# Patient Record
Sex: MALE | Race: WHITE | NOT HISPANIC OR LATINO | Employment: OTHER | URBAN - METROPOLITAN AREA
[De-identification: names, ages, dates, MRNs, and addresses within clinical notes are randomized per-mention and may not be internally consistent; named-entity substitution may affect disease eponyms.]

---

## 2018-06-20 LAB
BACTERIA UR QL AUTO: ABNORMAL
BILIRUB UR QL STRIP: NEGATIVE
CLARITY UR: CLEAR
COLOR UR: YELLOW
GLUCOSE UR STRIP-MCNC: NEGATIVE MG/DL
HGB UR QL STRIP.AUTO: NEGATIVE
KETONES UR STRIP-MCNC: NEGATIVE MG/DL
LEUKOCYTE ESTERASE UR QL STRIP: ABNORMAL
MUCUS THREADS (HISTORICAL): ABNORMAL /HPF
NITRITE UR QL STRIP: NEGATIVE
NON-SQ EPI CELLS URNS QL MICRO: ABNORMAL /HPF
PH UR STRIP.AUTO: 6 [PH] (ref 4.5–8)
PROT UR STRIP-MCNC: ABNORMAL MG/DL
RBC #/AREA URNS AUTO: ABNORMAL /HPF
SP GR UR STRIP.AUTO: 1.02 (ref 1–1.03)
UROBILINOGEN UR QL STRIP.AUTO: 0.2 EU/DL (ref 0.2–8)
WBC #/AREA URNS AUTO: ABNORMAL /HPF

## 2018-07-11 ENCOUNTER — TRANSCRIBE ORDERS (OUTPATIENT)
Dept: ADMINISTRATIVE | Facility: HOSPITAL | Age: 83
End: 2018-07-11

## 2018-07-11 ENCOUNTER — APPOINTMENT (OUTPATIENT)
Dept: LAB | Facility: HOSPITAL | Age: 83
End: 2018-07-11
Attending: UROLOGY
Payer: COMMERCIAL

## 2018-07-11 DIAGNOSIS — C61 PROSTATE CA (HCC): ICD-10-CM

## 2018-07-11 DIAGNOSIS — J44.9 CHRONIC OBSTRUCTIVE PULMONARY DISEASE, UNSPECIFIED COPD TYPE (HCC): Primary | ICD-10-CM

## 2018-07-11 DIAGNOSIS — I10 HYPERTENSION, UNSPECIFIED TYPE: ICD-10-CM

## 2018-07-11 DIAGNOSIS — K81.9 CHOLECYSTITIS: ICD-10-CM

## 2018-07-11 DIAGNOSIS — I48.91 ATRIAL FIBRILLATION, UNSPECIFIED TYPE (HCC): ICD-10-CM

## 2018-07-11 DIAGNOSIS — J44.9 CHRONIC OBSTRUCTIVE PULMONARY DISEASE, UNSPECIFIED COPD TYPE (HCC): ICD-10-CM

## 2018-07-11 LAB
ALBUMIN SERPL BCP-MCNC: 3.4 G/DL (ref 3.5–5.7)
ALP SERPL-CCNC: 92 U/L (ref 55–165)
ALT SERPL W P-5'-P-CCNC: 15 U/L (ref 7–52)
ANION GAP SERPL CALCULATED.3IONS-SCNC: 6 MMOL/L (ref 4–13)
AST SERPL W P-5'-P-CCNC: 19 U/L (ref 13–39)
BASOPHILS # BLD AUTO: 0 THOUSANDS/ΜL (ref 0–0.1)
BASOPHILS NFR BLD AUTO: 1 % (ref 0–2)
BILIRUB DIRECT SERPL-MCNC: 0.2 MG/DL (ref 0–0.2)
BILIRUB SERPL-MCNC: 0.5 MG/DL (ref 0.2–1)
BUN SERPL-MCNC: 26 MG/DL (ref 7–25)
CALCIUM SERPL-MCNC: 9.2 MG/DL (ref 8.6–10.5)
CHLORIDE SERPL-SCNC: 105 MMOL/L (ref 98–107)
CO2 SERPL-SCNC: 32 MMOL/L (ref 21–31)
CREAT SERPL-MCNC: 1.43 MG/DL (ref 0.7–1.3)
EOSINOPHIL # BLD AUTO: 0.2 THOUSAND/ΜL (ref 0–0.61)
EOSINOPHIL # BLD AUTO: 0.46 THOUSAND/UL (ref 0–0.61)
EOSINOPHIL NFR BLD AUTO: 3 % (ref 0–5)
EOSINOPHIL NFR BLD MANUAL: 6 % (ref 0–6)
ERYTHROCYTE [DISTWIDTH] IN BLOOD BY AUTOMATED COUNT: 13.8 % (ref 11.5–14.5)
GFR SERPL CREATININE-BSD FRML MDRD: 43 ML/MIN/1.73SQ M
GLUCOSE P FAST SERPL-MCNC: 96 MG/DL (ref 65–99)
HCT VFR BLD AUTO: 42.8 % (ref 36.5–49.3)
HGB BLD-MCNC: 14.6 G/DL (ref 14–18)
LYMPHOCYTES # BLD AUTO: 0.7 THOUSANDS/ΜL (ref 0.6–4.47)
LYMPHOCYTES # BLD AUTO: 1.16 THOUSAND/UL (ref 0.6–4.47)
LYMPHOCYTES # BLD AUTO: 15 % (ref 20–51)
LYMPHOCYTES NFR BLD AUTO: 9 % (ref 21–51)
MCH RBC QN AUTO: 30.9 PG (ref 26–34)
MCHC RBC AUTO-ENTMCNC: 34.1 G/DL (ref 31–37)
MCV RBC AUTO: 91 FL (ref 81–99)
MONOCYTES # BLD AUTO: 1 THOUSAND/UL (ref 0–1.22)
MONOCYTES # BLD AUTO: 1 THOUSAND/ΜL (ref 0.17–1.22)
MONOCYTES NFR BLD AUTO: 13 % (ref 4–12)
MONOCYTES NFR BLD AUTO: 14 % (ref 2–12)
NEUTROPHILS # BLD AUTO: 5.6 THOUSANDS/ΜL (ref 1.4–6.5)
NEUTS SEG # BLD: 5.08 THOUSAND/UL (ref 1.81–6.82)
NEUTS SEG NFR BLD AUTO: 66 % (ref 42–75)
NEUTS SEG NFR BLD AUTO: 74 % (ref 42–75)
NRBC BLD AUTO-RTO: 0 /100 WBCS
PLATELET # BLD AUTO: 201 THOUSANDS/UL (ref 149–390)
PLATELET BLD QL SMEAR: ADEQUATE
PMV BLD AUTO: 8.4 FL (ref 8.6–11.7)
POTASSIUM SERPL-SCNC: 4.1 MMOL/L (ref 3.5–5.5)
PROT SERPL-MCNC: 7.1 G/DL (ref 6.4–8.9)
PSA SERPL-MCNC: 0.1 NG/ML (ref 0–4)
RBC # BLD AUTO: 4.73 MILLION/UL (ref 4.3–5.9)
RBC MORPH BLD: NORMAL
SODIUM SERPL-SCNC: 143 MMOL/L (ref 134–143)
TOTAL CELLS COUNTED SPEC: 100
WBC # BLD AUTO: 7.7 THOUSAND/UL (ref 4.8–10.8)

## 2018-07-11 PROCEDURE — 80048 BASIC METABOLIC PNL TOTAL CA: CPT

## 2018-07-11 PROCEDURE — 85025 COMPLETE CBC W/AUTO DIFF WBC: CPT

## 2018-07-11 PROCEDURE — 36415 COLL VENOUS BLD VENIPUNCTURE: CPT

## 2018-07-11 PROCEDURE — 85027 COMPLETE CBC AUTOMATED: CPT

## 2018-07-11 PROCEDURE — 85007 BL SMEAR W/DIFF WBC COUNT: CPT

## 2018-07-11 PROCEDURE — 80076 HEPATIC FUNCTION PANEL: CPT

## 2018-07-11 PROCEDURE — G0103 PSA SCREENING: HCPCS

## 2019-01-11 ENCOUNTER — TRANSCRIBE ORDERS (OUTPATIENT)
Dept: ADMINISTRATIVE | Facility: HOSPITAL | Age: 84
End: 2019-01-11

## 2019-01-11 ENCOUNTER — APPOINTMENT (OUTPATIENT)
Dept: LAB | Facility: HOSPITAL | Age: 84
End: 2019-01-11
Attending: INTERNAL MEDICINE
Payer: COMMERCIAL

## 2019-01-11 DIAGNOSIS — I25.10 CORONARY ARTERY DISEASE WITHOUT ANGINA PECTORIS, UNSPECIFIED VESSEL OR LESION TYPE, UNSPECIFIED WHETHER NATIVE OR TRANSPLANTED HEART: ICD-10-CM

## 2019-01-11 DIAGNOSIS — I10 HYPERTENSION, UNSPECIFIED TYPE: ICD-10-CM

## 2019-01-11 DIAGNOSIS — J44.9 CHRONIC OBSTRUCTIVE PULMONARY DISEASE, UNSPECIFIED COPD TYPE (HCC): ICD-10-CM

## 2019-01-11 DIAGNOSIS — J44.9 CHRONIC OBSTRUCTIVE PULMONARY DISEASE, UNSPECIFIED COPD TYPE (HCC): Primary | ICD-10-CM

## 2019-01-11 LAB
ANION GAP SERPL CALCULATED.3IONS-SCNC: 5 MMOL/L (ref 4–13)
BUN SERPL-MCNC: 25 MG/DL (ref 7–25)
CALCIUM SERPL-MCNC: 9.2 MG/DL (ref 8.6–10.5)
CHLORIDE SERPL-SCNC: 106 MMOL/L (ref 98–107)
CO2 SERPL-SCNC: 33 MMOL/L (ref 21–31)
CREAT SERPL-MCNC: 1.5 MG/DL (ref 0.7–1.3)
ERYTHROCYTE [DISTWIDTH] IN BLOOD BY AUTOMATED COUNT: 14.3 % (ref 11.5–14.5)
GFR SERPL CREATININE-BSD FRML MDRD: 41 ML/MIN/1.73SQ M
GLUCOSE P FAST SERPL-MCNC: 98 MG/DL (ref 65–99)
HCT VFR BLD AUTO: 44.6 % (ref 36.5–49.3)
HGB BLD-MCNC: 15.5 G/DL (ref 14–18)
MCH RBC QN AUTO: 31.7 PG (ref 26–34)
MCHC RBC AUTO-ENTMCNC: 34.7 G/DL (ref 31–37)
MCV RBC AUTO: 91 FL (ref 81–99)
PLATELET # BLD AUTO: 157 THOUSANDS/UL (ref 149–390)
PMV BLD AUTO: 8.4 FL (ref 8.6–11.7)
POTASSIUM SERPL-SCNC: 4 MMOL/L (ref 3.5–5.5)
RBC # BLD AUTO: 4.9 MILLION/UL (ref 4.3–5.9)
SODIUM SERPL-SCNC: 144 MMOL/L (ref 134–143)
WBC # BLD AUTO: 7.1 THOUSAND/UL (ref 4.8–10.8)

## 2019-01-11 PROCEDURE — 80048 BASIC METABOLIC PNL TOTAL CA: CPT

## 2019-01-11 PROCEDURE — 36415 COLL VENOUS BLD VENIPUNCTURE: CPT

## 2019-01-11 PROCEDURE — 85027 COMPLETE CBC AUTOMATED: CPT

## 2019-07-17 ENCOUNTER — APPOINTMENT (OUTPATIENT)
Dept: LAB | Facility: HOSPITAL | Age: 84
End: 2019-07-17
Attending: INTERNAL MEDICINE
Payer: COMMERCIAL

## 2019-07-17 ENCOUNTER — TRANSCRIBE ORDERS (OUTPATIENT)
Dept: LAB | Facility: HOSPITAL | Age: 84
End: 2019-07-17

## 2019-07-17 DIAGNOSIS — J44.9 CHRONIC OBSTRUCTIVE PULMONARY DISEASE, UNSPECIFIED COPD TYPE (HCC): ICD-10-CM

## 2019-07-17 DIAGNOSIS — E53.8 VITAMIN B12 DEFICIENCY: ICD-10-CM

## 2019-07-17 DIAGNOSIS — E53.8 VITAMIN B12 DEFICIENCY: Primary | ICD-10-CM

## 2019-07-17 LAB
ANION GAP SERPL CALCULATED.3IONS-SCNC: 8 MMOL/L (ref 4–13)
BUN SERPL-MCNC: 31 MG/DL (ref 7–25)
CALCIUM SERPL-MCNC: 9.2 MG/DL (ref 8.6–10.5)
CHLORIDE SERPL-SCNC: 101 MMOL/L (ref 98–107)
CO2 SERPL-SCNC: 31 MMOL/L (ref 21–31)
CREAT SERPL-MCNC: 1.74 MG/DL (ref 0.7–1.3)
ERYTHROCYTE [DISTWIDTH] IN BLOOD BY AUTOMATED COUNT: 13.9 % (ref 11.5–14.5)
GFR SERPL CREATININE-BSD FRML MDRD: 34 ML/MIN/1.73SQ M
GLUCOSE P FAST SERPL-MCNC: 90 MG/DL (ref 65–99)
HCT VFR BLD AUTO: 43.3 % (ref 42–47)
HGB BLD-MCNC: 14.7 G/DL (ref 14–18)
MCH RBC QN AUTO: 31.2 PG (ref 26–34)
MCHC RBC AUTO-ENTMCNC: 33.9 G/DL (ref 31–37)
MCV RBC AUTO: 92 FL (ref 81–99)
PLATELET # BLD AUTO: 173 THOUSANDS/UL (ref 149–390)
PMV BLD AUTO: 8.5 FL (ref 8.6–11.7)
POTASSIUM SERPL-SCNC: 4.1 MMOL/L (ref 3.5–5.5)
RBC # BLD AUTO: 4.71 MILLION/UL (ref 4.3–5.9)
SODIUM SERPL-SCNC: 140 MMOL/L (ref 134–143)
VIT B12 SERPL-MCNC: 375 PG/ML (ref 100–900)
WBC # BLD AUTO: 7.4 THOUSAND/UL (ref 4.8–10.8)

## 2019-07-17 PROCEDURE — 85027 COMPLETE CBC AUTOMATED: CPT

## 2019-07-17 PROCEDURE — 36415 COLL VENOUS BLD VENIPUNCTURE: CPT

## 2019-07-17 PROCEDURE — 82607 VITAMIN B-12: CPT

## 2019-07-17 PROCEDURE — 80048 BASIC METABOLIC PNL TOTAL CA: CPT

## 2020-02-20 ENCOUNTER — APPOINTMENT (OUTPATIENT)
Dept: LAB | Facility: HOSPITAL | Age: 85
End: 2020-02-20
Attending: INTERNAL MEDICINE
Payer: COMMERCIAL

## 2020-02-20 ENCOUNTER — TRANSCRIBE ORDERS (OUTPATIENT)
Dept: LAB | Facility: HOSPITAL | Age: 85
End: 2020-02-20

## 2020-02-20 DIAGNOSIS — J44.9 CHRONIC OBSTRUCTIVE PULMONARY DISEASE, UNSPECIFIED COPD TYPE (HCC): ICD-10-CM

## 2020-02-20 DIAGNOSIS — J44.9 CHRONIC OBSTRUCTIVE PULMONARY DISEASE, UNSPECIFIED COPD TYPE (HCC): Primary | ICD-10-CM

## 2020-02-20 LAB
ANION GAP SERPL CALCULATED.3IONS-SCNC: 6 MMOL/L (ref 4–13)
BUN SERPL-MCNC: 21 MG/DL (ref 7–25)
CALCIUM SERPL-MCNC: 9.1 MG/DL (ref 8.6–10.5)
CHLORIDE SERPL-SCNC: 101 MMOL/L (ref 98–107)
CO2 SERPL-SCNC: 34 MMOL/L (ref 21–31)
CREAT SERPL-MCNC: 1.64 MG/DL (ref 0.7–1.3)
ERYTHROCYTE [DISTWIDTH] IN BLOOD BY AUTOMATED COUNT: 14.4 % (ref 11.5–14.5)
GFR SERPL CREATININE-BSD FRML MDRD: 37 ML/MIN/1.73SQ M
GLUCOSE SERPL-MCNC: 101 MG/DL (ref 65–99)
HCT VFR BLD AUTO: 42.5 % (ref 42–47)
HGB BLD-MCNC: 14.1 G/DL (ref 14–18)
MCH RBC QN AUTO: 31 PG (ref 26–34)
MCHC RBC AUTO-ENTMCNC: 33.3 G/DL (ref 31–37)
MCV RBC AUTO: 93 FL (ref 81–99)
PLATELET # BLD AUTO: 146 THOUSANDS/UL (ref 149–390)
PMV BLD AUTO: 8.2 FL (ref 8.6–11.7)
POTASSIUM SERPL-SCNC: 4.1 MMOL/L (ref 3.5–5.5)
RBC # BLD AUTO: 4.56 MILLION/UL (ref 4.3–5.9)
SODIUM SERPL-SCNC: 141 MMOL/L (ref 134–143)
WBC # BLD AUTO: 6.6 THOUSAND/UL (ref 4.8–10.8)

## 2020-02-20 PROCEDURE — 36415 COLL VENOUS BLD VENIPUNCTURE: CPT

## 2020-02-20 PROCEDURE — 80048 BASIC METABOLIC PNL TOTAL CA: CPT

## 2020-02-20 PROCEDURE — 85027 COMPLETE CBC AUTOMATED: CPT

## 2020-05-18 ENCOUNTER — HOSPITAL ENCOUNTER (OUTPATIENT)
Dept: RADIOLOGY | Facility: HOSPITAL | Age: 85
Discharge: HOME | End: 2020-05-18
Attending: INTERNAL MEDICINE
Payer: COMMERCIAL

## 2020-05-18 ENCOUNTER — TRANSCRIBE ORDERS (OUTPATIENT)
Dept: ADMINISTRATIVE | Facility: HOSPITAL | Age: 85
End: 2020-05-18

## 2020-05-18 DIAGNOSIS — M25.561 RIGHT KNEE PAIN, UNSPECIFIED CHRONICITY: Primary | ICD-10-CM

## 2020-05-18 DIAGNOSIS — M25.561 RIGHT KNEE PAIN, UNSPECIFIED CHRONICITY: ICD-10-CM

## 2020-05-18 PROCEDURE — 73560 X-RAY EXAM OF KNEE 1 OR 2: CPT

## 2020-09-16 ENCOUNTER — HOSPITAL ENCOUNTER (EMERGENCY)
Facility: HOSPITAL | Age: 85
Discharge: HOME | End: 2020-09-16
Attending: EMERGENCY MEDICINE | Admitting: EMERGENCY MEDICINE
Payer: COMMERCIAL

## 2020-09-16 ENCOUNTER — APPOINTMENT (EMERGENCY)
Dept: CT IMAGING | Facility: HOSPITAL | Age: 85
End: 2020-09-16
Payer: COMMERCIAL

## 2020-09-16 ENCOUNTER — APPOINTMENT (EMERGENCY)
Dept: RADIOLOGY | Facility: HOSPITAL | Age: 85
End: 2020-09-16
Payer: COMMERCIAL

## 2020-09-16 VITALS
HEIGHT: 72 IN | BODY MASS INDEX: 21.95 KG/M2 | RESPIRATION RATE: 20 BRPM | DIASTOLIC BLOOD PRESSURE: 68 MMHG | HEART RATE: 88 BPM | WEIGHT: 162.04 LBS | OXYGEN SATURATION: 99 % | SYSTOLIC BLOOD PRESSURE: 139 MMHG | TEMPERATURE: 97.5 F
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
## 2020-09-16 DIAGNOSIS — R79.1 ELEVATED INR: ICD-10-CM

## 2020-09-16 DIAGNOSIS — I71.4: ICD-10-CM

## 2020-09-16 LAB
ALBUMIN SERPL BCP-MCNC: 3.3 G/DL (ref 3.5–5.7)
ALP SERPL-CCNC: 93 U/L (ref 55–165)
ALT SERPL W P-5'-P-CCNC: 10 U/L (ref 7–52)
ANION GAP SERPL CALCULATED.3IONS-SCNC: 7 MMOL/L (ref 4–13)
APTT PPP: 54 SECONDS (ref 23–37)
AST SERPL W P-5'-P-CCNC: 18 U/L (ref 13–39)
BASOPHILS # BLD AUTO: 0 THOUSANDS/ΜL (ref 0–0.1)
BASOPHILS NFR BLD AUTO: 0 % (ref 0–2)
BILIRUB SERPL-MCNC: 0.4 MG/DL (ref 0.2–1)
BUN SERPL-MCNC: 57 MG/DL (ref 7–25)
CALCIUM ALBUM COR SERPL-MCNC: 9.9 MG/DL (ref 8.3–10.1)
CALCIUM SERPL-MCNC: 9.3 MG/DL (ref 8.6–10.5)
CHLORIDE SERPL-SCNC: 100 MMOL/L (ref 98–107)
CO2 SERPL-SCNC: 35 MMOL/L (ref 21–31)
CREAT SERPL-MCNC: 2 MG/DL (ref 0.7–1.3)
EOSINOPHIL # BLD AUTO: 0 THOUSAND/ΜL (ref 0–0.61)
EOSINOPHIL NFR BLD AUTO: 1 % (ref 0–5)
ERYTHROCYTE [DISTWIDTH] IN BLOOD BY AUTOMATED COUNT: 15.2 % (ref 11.5–14.5)
GFR SERPL CREATININE-BSD FRML MDRD: 29 ML/MIN/1.73SQ M
GLUCOSE SERPL-MCNC: 96 MG/DL (ref 65–99)
HCT VFR BLD AUTO: 30.9 % (ref 42–47)
HGB BLD-MCNC: 10.2 G/DL (ref 14–18)
INR PPP: 4.09 (ref 0.84–1.19)
LYMPHOCYTES # BLD AUTO: 0.5 THOUSANDS/ΜL (ref 0.6–4.47)
LYMPHOCYTES NFR BLD AUTO: 5 % (ref 21–51)
MAGNESIUM SERPL-MCNC: 1.9 MG/DL (ref 1.9–2.7)
MCH RBC QN AUTO: 27.8 PG (ref 26–34)
MCHC RBC AUTO-ENTMCNC: 32.9 G/DL (ref 31–37)
MCV RBC AUTO: 85 FL (ref 81–99)
MONOCYTES # BLD AUTO: 1 THOUSAND/ΜL (ref 0.17–1.22)
MONOCYTES NFR BLD AUTO: 11 % (ref 2–12)
NEUTROPHILS # BLD AUTO: 7.7 THOUSANDS/ΜL (ref 1.4–6.5)
NEUTS SEG NFR BLD AUTO: 83 % (ref 42–75)
PLATELET # BLD AUTO: 335 THOUSANDS/UL (ref 149–390)
PMV BLD AUTO: 7.6 FL (ref 8.6–11.7)
POTASSIUM SERPL-SCNC: 4 MMOL/L (ref 3.5–5.5)
PROT SERPL-MCNC: 7.6 G/DL (ref 6.4–8.9)
PROTHROMBIN TIME: 39 SECONDS (ref 11.6–14.5)
RBC # BLD AUTO: 3.66 MILLION/UL (ref 4.3–5.9)
SODIUM SERPL-SCNC: 142 MMOL/L (ref 134–143)
WBC # BLD AUTO: 9.3 THOUSAND/UL (ref 4.8–10.8)

## 2020-09-16 PROCEDURE — 85610 PROTHROMBIN TIME: CPT | Performed by: EMERGENCY MEDICINE

## 2020-09-16 PROCEDURE — 70450 CT HEAD/BRAIN W/O DYE: CPT

## 2020-09-16 PROCEDURE — G1004 CDSM NDSC: HCPCS

## 2020-09-16 PROCEDURE — 72125 CT NECK SPINE W/O DYE: CPT

## 2020-09-16 PROCEDURE — 71260 CT THORAX DX C+: CPT

## 2020-09-16 PROCEDURE — 83735 ASSAY OF MAGNESIUM: CPT | Performed by: EMERGENCY MEDICINE

## 2020-09-16 PROCEDURE — 99284 EMERGENCY DEPT VISIT MOD MDM: CPT

## 2020-09-16 PROCEDURE — 80053 COMPREHEN METABOLIC PANEL: CPT | Performed by: EMERGENCY MEDICINE

## 2020-09-16 PROCEDURE — 73502 X-RAY EXAM HIP UNI 2-3 VIEWS: CPT

## 2020-09-16 PROCEDURE — 85730 THROMBOPLASTIN TIME PARTIAL: CPT | Performed by: EMERGENCY MEDICINE

## 2020-09-16 PROCEDURE — 85025 COMPLETE CBC W/AUTO DIFF WBC: CPT | Performed by: EMERGENCY MEDICINE

## 2020-09-16 PROCEDURE — 99285 EMERGENCY DEPT VISIT HI MDM: CPT | Performed by: EMERGENCY MEDICINE

## 2020-09-16 PROCEDURE — 74177 CT ABD & PELVIS W/CONTRAST: CPT

## 2020-09-16 PROCEDURE — 36415 COLL VENOUS BLD VENIPUNCTURE: CPT | Performed by: EMERGENCY MEDICINE

## 2020-09-16 RX ADMIN — IOHEXOL 100 ML: 350 INJECTION, SOLUTION INTRAVENOUS at 10:07

## 2020-09-16 NOTE — DISCHARGE INSTRUCTIONS
Arnol Boucher was seen in the emergency department after his fall and landing on his right hip  He did not have an significant injuries on his CT scans  He had multiple incidental findings during his stay in the emergency department  This includes the enlarged aortic aneurysm which is larger than in prior studies  As you discussed with the prior vascular specialists, this may lead to sudden and unpreventable death  Follow up with your PCP for further guidance  Additionally, your INR was elevated from your Coumadin to 4 02  Hold your evening dose and call your PCP today to discuss follow up

## 2020-09-16 NOTE — ED PROVIDER NOTES
History  Chief Complaint   Patient presents with    Hip Pain     Pt fell this morning, landing on his knees  Pt was using a cane even though he was told to use a walker  Pt states he landed on his knees then R hip  Patient presents after fall from standing this morning  States he was going to the bathroom and felt his right knee "give" out  He normally walks with a rollator but was using a cane this morning, which he believes contributed to the fall  He landed on bilateral knees and then his right side  Denies hip pain  EMS reports he is on coumadin for a fib  Denies LOC  Hip Pain   Location:  Right hip  Quality:  Dull  Severity:  Mild  Onset quality:  Sudden  Duration:  1 hour  Timing:  Constant  Progression:  Improving  Chronicity:  New  Context:  Fall  Associated symptoms: no abdominal pain, no chest pain, no congestion, no cough, no diarrhea, no fever, no nausea, no rash, no rhinorrhea, no sore throat, no vomiting and no wheezing        Prior to Admission Medications   Prescriptions Last Dose Informant Patient Reported? Taking?    OXYBUTYNIN TD  Self Yes No   Sig: Place on the skin   albuterol (PROAIR HFA) 90 mcg/act inhaler  Self Yes No   Sig: ProAir HFA 90 mcg/actuation aerosol inhaler   furosemide (LASIX) 40 mg tablet  Self Yes No   Sig: furosemide 40 mg tablet   take 1 tablet by mouth once daily   potassium chloride (MICRO-K) 10 MEQ CR capsule  Self Yes No   Sig: potassium chloride ER 10 mEq capsule,extended release   ursodiol (ACTIGALL) 250 mg tablet   Yes No   Sig: Take 250 mg by mouth 3 (three) times a day   verapamil (CALAN-SR) 180 mg CR tablet  Self Yes No   Sig: verapamil ER (SR) 180 mg tablet,extended release      Facility-Administered Medications: None       Past Medical History:   Diagnosis Date    A-fib ([facility] )     BPH (benign prostatic hyperplasia)     Cholelithiasis     Hypertension     Prostate cancer [facility])        Past Surgical History:   Procedure Laterality Date    TONSILLECTOMY History reviewed  No pertinent family history  I have reviewed and agree with the history as documented  E-Cigarette/Vaping     E-Cigarette/Vaping Substances     Social History     Tobacco Use    Smoking status: Current Some Day Smoker     Packs/day: 0 50     Types: Cigarettes    Smokeless tobacco: Never Used   Substance Use Topics    Alcohol use: Not Currently    Drug use: Never       Review of Systems   Constitutional: Negative for chills and fever  HENT: Negative for congestion, rhinorrhea and sore throat  Eyes: Negative for photophobia and visual disturbance  Respiratory: Negative for cough and wheezing  Cardiovascular: Negative for chest pain and palpitations  Gastrointestinal: Negative for abdominal pain, diarrhea, nausea and vomiting  Genitourinary: Negative for difficulty urinating and testicular pain  Musculoskeletal: Negative for back pain and neck pain  Skin: Negative for rash and wound  Neurological: Negative for weakness and numbness  Psychiatric/Behavioral: Negative for dysphoric mood and suicidal ideas  Physical Exam  Physical Exam  Vitals signs and nursing note reviewed  Constitutional:       Appearance: Normal appearance  He is well-developed  HENT:      Head: Normocephalic and atraumatic  Eyes:      Conjunctiva/sclera: Conjunctivae normal       Pupils: Pupils are equal, round, and reactive to light  Neck:      Musculoskeletal: Normal range of motion and neck supple  No muscular tenderness  Trachea: No tracheal deviation  Cardiovascular:      Rate and Rhythm: Normal rate and regular rhythm  Pulses:           Radial pulses are 2+ on the right side and 2+ on the left side  Dorsalis pedis pulses are 2+ on the right side and 2+ on the left side  Posterior tibial pulses are 2+ on the right side and 2+ on the left side  Heart sounds: Normal heart sounds  No murmur     Pulmonary:      Effort: Pulmonary effort is normal  No respiratory distress  Breath sounds: Normal breath sounds  No wheezing or rales  Abdominal:      General: Bowel sounds are normal  There is no distension  Palpations: Abdomen is soft  Tenderness: There is no abdominal tenderness  Musculoskeletal: Normal range of motion  General: No tenderness or deformity  Comments: No hip tenderness   Skin:     General: Skin is warm and dry  Capillary Refill: Capillary refill takes less than 2 seconds  Neurological:      General: No focal deficit present  Mental Status: He is alert  He is disoriented  Sensory: No sensory deficit        Comments: AAOx2, not to time (baseline per daughter Jaci Arredondo)   Psychiatric:         Judgment: Judgment normal          Vital Signs  ED Triage Vitals [09/16/20 0939]   Temperature Pulse Respirations Blood Pressure SpO2   97 5 °F (36 4 °C) 88 20 139/68 99 %      Temp Source Heart Rate Source Patient Position - Orthostatic VS BP Location FiO2 (%)   Temporal Monitor Sitting Left arm --      Pain Score       --           Vitals:    09/16/20 0939   BP: 139/68   Pulse: 88   Patient Position - Orthostatic VS: Sitting         Visual Acuity  Visual Acuity      Most Recent Value   L Pupil Size (mm)  3   R Pupil Size (mm)  3          ED Medications  Medications   iohexol (OMNIPAQUE) 350 MG/ML injection (MULTI-DOSE) 100 mL (100 mL Intravenous Given 9/16/20 1007)       Diagnostic Studies  Results Reviewed     Procedure Component Value Units Date/Time    Protime-INR [[id number]]  (Abnormal) Collected:  09/16/20 0955    Lab Status:  Final result Specimen:  Blood from Arm, Left Updated:  09/16/20 1048     Protime 39 0 seconds      INR 4 09    APTT [[id number]]  (Abnormal) Collected:  09/16/20 0955    Lab Status:  Final result Specimen:  Blood from Arm, Left Updated:  09/16/20 1048     PTT 54 seconds     Comprehensive metabolic panel [[id number]]  (Abnormal) Collected:  09/16/20 0955    Lab Status:  Final result Specimen: Blood from Arm, Left Updated:  09/16/20 1048     Sodium 142 mmol/L      Potassium 4 0 mmol/L      Chloride 100 mmol/L      CO2 35 mmol/L      ANION GAP 7 mmol/L      BUN 57 mg/dL      Creatinine 2 00 mg/dL      Glucose 96 mg/dL      Calcium 9 3 mg/dL      Corrected Calcium 9 9 mg/dL      AST 18 U/L      ALT 10 U/L      Alkaline Phosphatase 93 U/L      Total Protein 7 6 g/dL      Albumin 3 3 g/dL      Total Bilirubin 0 40 mg/dL      eGFR 29 ml/min/1 73sq m     Narrative:       National Kid[employer] Disease Foundation guidelines for Chronic Kidney Disease (CKD):     Stage 1 with normal or high GFR (GFR > 90 mL/min/1 73 square meters)    Stage 2 Mild CKD (GFR = 60-89 mL/min/1 73 square meters)    Stage 3A Moderate CKD (GFR = 45-59 mL/min/1 73 square meters)    Stage 3B Moderate CKD (GFR = 30-44 mL/min/1 73 square meters)    Stage 4 Severe CKD (GFR = 15-29 mL/min/1 73 square meters)    Stage 5 End Stage CKD (GFR <15 mL/min/1 73 square meters)  Note: GFR calculation is accurate only with a steady state creatinine    Magnesium [[id number]]  (Normal) Collected:  09/16/20 0955    Lab Status:  Final result Specimen:  Blood from Arm, Left Updated:  09/16/20 1043     Magnesium 1 9 mg/dL     CBC and differential [[id number]]  (Abnormal) Collected:  09/16/20 0955    Lab Status:  Final result Specimen:  Blood from Arm, Left Updated:  09/16/20 1035     WBC 9 30 Thousand/uL      RBC 3 66 Million/uL      Hemoglobin 10 2 g/dL      Hematocrit 30 9 %      MCV 85 fL      MCH 27 8 pg      MCHC 32 9 g/dL      RDW 15 2 %      MPV 7 6 fL      Platelets 094 Thousands/uL      Neutrophils Relative 83 %      Lymphocytes Relative 5 %      Monocytes Relative 11 %      Eosinophils Relative 1 %      Basophils Relative 0 %      Neutrophils Absolute 7 70 Thousands/µL      Lymphocytes Absolute 0 50 Thousands/µL      Monocytes Absolute 1 00 Thousand/µL      Eosinophils Absolute 0 00 Thousand/µL      Basophils Absolute 0 00 Thousands/µL XR hip/pelv 2-3 vws right if performed   Final Result by Latesha Pabon MD (09/16 1224)   Mild degenerative arthritis both hips   Multilevel degenerative lumbar spondylosis   No acute osseous abnormality  Workstation performed: [id number]         TRAUMA - CT chest abdomen pelvis w contrast   Final Result by Kendell Beebe MD (09/16 1035)         1  Massive, 8 cm abdominal aortic aneurysm has significantly increased in size from the prior study  Further clinical assessment recommended  Consider vascular surgery assessment, as clinically indicated  Considerations related to the patient's age    and/or comorbidities may be used to alter these recommendations      2   Emphysema  3   Small area of strandy densities in the right upper lobe may represent scarring with traction bronchiectasis versus mild pneumonitis  Further clinical assessment recommended  4   Cholelithiasis  5   Nonobstructing right intrarenal calculus  No hydronephrosis  6   No acute traumatic injury detected in the chest, abdomen or pelvis  Workstation performed: [id number]         TRAUMA - CT spine cervical wo contrast   Final Result by Kendell Beebe MD (09/16 1023)         1  No acute fracture or subluxation  2   Moderate spondylosis  3   Emphysema  Workstation performed: [id number]         TRAUMA - CT head wo contrast   Final Result by Kendell Beebe MD (09/16 1015)      No acute intracranial hemorrhage, mass effect or edema  Microangiopathic changes  Workstation performed: [id number]                    Procedures  Procedures         ED Course  ED Course as of Sep 16 1448   Wed Sep 16, 2020   1107 Large AAA on CT, increased from size  Patient previously deemed unsuitable for intervention by vascular surgery in 2018  His pulses are intact  INR elevated to 4 but no active bleeding         1133 Discussed AAA with patient who remembers his prior evaluation by vascular surgery and is comfortable without doing anything  I also called his daughter who confirmed he is at his baseline, understands this aneurysm enlarged and may rupture causing immediate and unavoidable death  Additionally, he has an elevated INR and will follow up with Dr Rachelle Ren and call the office today for further recommendation  She will come to the ER and verify he is at his baseline mental status, and able to ambulate with the rollator  MDM  Number of Diagnoses or Management Options  Aortic aneurysm, abdominal ([facility] [address] ): established and worsening  Elevated INR: new and requires workup  Fall, initial encounter: new and requires workup  Diagnosis management comments: Patient with some confusion on exam, possibly baseline but in the setting of trauma on coumadin, trauma eval called on provider discretion  Will CT head/spine/cap as patient is poor historian  No pain or tenderness on exam      CT head, spine, cap reassuring  Some incidental findings; large 8 2cm aneurysm  Asymptomatic  Chart review indicates patient was evaluated by vascular surgery 2 years ago and had expected clinical course of enlarged aneurysm and rupture, leading to eventual and unavoidable death  Patient was informed of this and recalls this discussion with the vascular specialists and is comfortable with this scenario  I also discussed this with his daughter Claire Spring who is comfortable with this plan  Additionally, he had elevated INR   Will hold coumadin       Amount and/or Complexity of Data Reviewed  Obtain history from someone other than the patient: yes  Review and summarize past medical records: yes  Independent visualization of images, tracings, or specimens: yes    Risk of Complications, Morbidity, and/or Mortality  Presenting problems: high  Diagnostic procedures: minimal  Management options: minimal        Disposition  Final diagnoses:   Fall, initial encounter   Aortic aneurysm, abdominal ([facility] [address] ) Elevated INR     Time reflects when diagnosis was documented in both MDM as applicable and the Disposition within this note     Time User Action Codes Description Comment    9/16/2020 11:43 AM Deisi Costa [Q34  Keshawn Blotter Fall, initial encounter     9/16/2020 11:43 AM Deisi Costa [I71 4] Aortic aneurysm, abdominal ([address] )     9/16/2020 11:43 AM Deisi Costa [R79 1] Elevated INR       ED Disposition     ED Disposition Condition Date/Time Comment    Discharge Stable Wed Sep 16, 2020 11:55 AM Eric Rodriguez discharge to home/self care  Follow-up Information     Follow up With Specialties Details Why Contact Info    Shani Roth DO Internal Medicine Call today  [address] [facility]  [phone]            Discharge Medication List as of 9/16/2020 11:56 AM      CONTINUE these medications which have NOT CHANGED    Details   albuterol (PROAIR HFA) 90 mcg/act inhaler ProAir HFA 90 mcg/actuation aerosol inhaler, Historical Med      furosemide (LASIX) 40 mg tablet furosemide 40 mg tablet   take 1 tablet by mouth once daily, Historical Med      OXYBUTYNIN TD Place on the skin, Historical Med      potassium chloride (MICRO-K) 10 MEQ CR capsule potassium chloride ER 10 mEq capsule,extended release, Historical Med      ursodiol (ACTIGALL) 250 mg tablet Take 250 mg by mouth 3 (three) times a day, Historical Med      verapamil (CALAN-SR) 180 mg CR tablet verapamil ER (SR) 180 mg tablet,extended release, Historical Med           No discharge procedures on file      PDMP Review     None          ED Provider  Electronically Signed by           Darylene Hugger, DO  09/16/20 3877

## 2020-10-08 ENCOUNTER — HOSPITAL ENCOUNTER (EMERGENCY)
Facility: HOSPITAL | Age: 85
Discharge: HOME | End: 2020-10-08
Attending: FAMILY MEDICINE | Admitting: EMERGENCY MEDICINE
Payer: COMMERCIAL

## 2020-10-08 ENCOUNTER — APPOINTMENT (EMERGENCY)
Dept: RADIOLOGY | Facility: HOSPITAL | Age: 85
End: 2020-10-08
Payer: COMMERCIAL

## 2020-10-08 VITALS
HEART RATE: 84 BPM | DIASTOLIC BLOOD PRESSURE: 64 MMHG | OXYGEN SATURATION: 98 % | SYSTOLIC BLOOD PRESSURE: 117 MMHG | RESPIRATION RATE: 28 BRPM | BODY MASS INDEX: 21.54 KG/M2 | WEIGHT: 159 LBS | HEIGHT: 72 IN

## 2020-10-08 DIAGNOSIS — R53.1 WEAKNESS: ICD-10-CM

## 2020-10-08 DIAGNOSIS — R79.81 LOW OXYGEN SATURATION: Primary | ICD-10-CM

## 2020-10-08 DIAGNOSIS — F03.90 DEMENTIA (HCC): ICD-10-CM

## 2020-10-08 LAB
ALBUMIN SERPL BCP-MCNC: 3.1 G/DL (ref 3.5–5.7)
ALP SERPL-CCNC: 94 U/L (ref 55–165)
ALT SERPL W P-5'-P-CCNC: 11 U/L (ref 7–52)
ANION GAP SERPL CALCULATED.3IONS-SCNC: 8 MMOL/L (ref 4–13)
APTT PPP: 45 SECONDS (ref 23–37)
AST SERPL W P-5'-P-CCNC: 15 U/L (ref 13–39)
BASE EX.OXY STD BLDV CALC-SCNC: 46.5 %
BASE EXCESS BLDV CALC-SCNC: 5.5 MMOL/L
BILIRUB SERPL-MCNC: 0.3 MG/DL (ref 0.2–1)
BUN SERPL-MCNC: 52 MG/DL (ref 7–25)
CALCIUM ALBUM COR SERPL-MCNC: 9.4 MG/DL (ref 8.3–10.1)
CALCIUM SERPL-MCNC: 8.7 MG/DL (ref 8.6–10.5)
CHLORIDE SERPL-SCNC: 98 MMOL/L (ref 98–107)
CO2 SERPL-SCNC: 31 MMOL/L (ref 21–31)
CREAT SERPL-MCNC: 2.01 MG/DL (ref 0.7–1.3)
EOSINOPHIL # BLD AUTO: 0.17 THOUSAND/UL (ref 0–0.61)
EOSINOPHIL NFR BLD MANUAL: 2 % (ref 0–6)
ERYTHROCYTE [DISTWIDTH] IN BLOOD BY AUTOMATED COUNT: 16.5 % (ref 11.5–14.5)
FLUAV RNA NPH QL NAA+PROBE: NORMAL
FLUBV RNA NPH QL NAA+PROBE: NORMAL
GFR SERPL CREATININE-BSD FRML MDRD: 28 ML/MIN/1.73SQ M
GLUCOSE SERPL-MCNC: 94 MG/DL (ref 65–99)
HCO3 BLDV-SCNC: 32.2 MMOL/L (ref 24–30)
HCT VFR BLD AUTO: 27.3 % (ref 42–47)
HGB BLD-MCNC: 9 G/DL (ref 14–18)
INR PPP: 1.91 (ref 0.84–1.19)
LACTATE SERPL-SCNC: 1.5 MMOL/L (ref 0.5–2)
LIPASE SERPL-CCNC: 19 U/L (ref 11–82)
LYMPHOCYTES # BLD AUTO: 1.11 THOUSAND/UL (ref 0.6–4.47)
LYMPHOCYTES # BLD AUTO: 13 % (ref 20–51)
MAGNESIUM SERPL-MCNC: 2.2 MG/DL (ref 1.9–2.7)
MCH RBC QN AUTO: 27.9 PG (ref 26–34)
MCHC RBC AUTO-ENTMCNC: 33 G/DL (ref 31–37)
MCV RBC AUTO: 85 FL (ref 81–99)
MONOCYTES # BLD AUTO: 1.02 THOUSAND/UL (ref 0–1.22)
MONOCYTES NFR BLD AUTO: 12 % (ref 4–12)
NEUTS BAND NFR BLD MANUAL: 2 % (ref 0–8)
NEUTS SEG # BLD: 6.21 THOUSAND/UL (ref 1.81–6.82)
NEUTS SEG NFR BLD AUTO: 71 % (ref 42–75)
O2 CT BLDV-SCNC: 5.6 ML/DL
PCO2 BLDV: 58 MM HG
PH BLDV: 7.36 [PH] (ref 7.3–7.4)
PLATELET # BLD AUTO: 240 THOUSANDS/UL (ref 149–390)
PLATELET BLD QL SMEAR: ADEQUATE
PMV BLD AUTO: 7.4 FL (ref 8.6–11.7)
PO2 BLDV: 34 MM HG (ref 35–45)
POTASSIUM SERPL-SCNC: 4.3 MMOL/L (ref 3.5–5.5)
PROT SERPL-MCNC: 7.1 G/DL (ref 6.4–8.9)
PROTHROMBIN TIME: 21.6 SECONDS (ref 11.6–14.5)
RBC # BLD AUTO: 3.23 MILLION/UL (ref 4.3–5.9)
RBC MORPH BLD: NORMAL
RSV RNA NPH QL NAA+PROBE: NORMAL
SARS-COV-2 RNA RESP QL NAA+PROBE: NEGATIVE
SODIUM SERPL-SCNC: 137 MMOL/L (ref 134–143)
TOTAL CELLS COUNTED SPEC: 100
TROPONIN I SERPL-MCNC: <0.03 NG/ML
TSH SERPL DL<=0.05 MIU/L-ACNC: 3.72 UIU/ML (ref 0.45–5.33)
WBC # BLD AUTO: 8.5 THOUSAND/UL (ref 4.8–10.8)

## 2020-10-08 PROCEDURE — 80053 COMPREHEN METABOLIC PANEL: CPT | Performed by: EMERGENCY MEDICINE

## 2020-10-08 PROCEDURE — 71045 X-RAY EXAM CHEST 1 VIEW: CPT

## 2020-10-08 PROCEDURE — 82805 BLOOD GASES W/O2 SATURATION: CPT | Performed by: EMERGENCY MEDICINE

## 2020-10-08 PROCEDURE — 83690 ASSAY OF LIPASE: CPT | Performed by: EMERGENCY MEDICINE

## 2020-10-08 PROCEDURE — 84443 ASSAY THYROID STIM HORMONE: CPT | Performed by: EMERGENCY MEDICINE

## 2020-10-08 PROCEDURE — 87631 RESP VIRUS 3-5 TARGETS: CPT | Performed by: EMERGENCY MEDICINE

## 2020-10-08 PROCEDURE — 87635 SARS-COV-2 COVID-19 AMP PRB: CPT | Performed by: EMERGENCY MEDICINE

## 2020-10-08 PROCEDURE — 36415 COLL VENOUS BLD VENIPUNCTURE: CPT | Performed by: EMERGENCY MEDICINE

## 2020-10-08 PROCEDURE — 85730 THROMBOPLASTIN TIME PARTIAL: CPT | Performed by: EMERGENCY MEDICINE

## 2020-10-08 PROCEDURE — 83605 ASSAY OF LACTIC ACID: CPT | Performed by: EMERGENCY MEDICINE

## 2020-10-08 PROCEDURE — 85027 COMPLETE CBC AUTOMATED: CPT | Performed by: EMERGENCY MEDICINE

## 2020-10-08 PROCEDURE — 85007 BL SMEAR W/DIFF WBC COUNT: CPT | Performed by: EMERGENCY MEDICINE

## 2020-10-08 PROCEDURE — 99284 EMERGENCY DEPT VISIT MOD MDM: CPT

## 2020-10-08 PROCEDURE — 85610 PROTHROMBIN TIME: CPT | Performed by: EMERGENCY MEDICINE

## 2020-10-08 PROCEDURE — 87040 BLOOD CULTURE FOR BACTERIA: CPT | Performed by: EMERGENCY MEDICINE

## 2020-10-08 PROCEDURE — 93005 ELECTROCARDIOGRAM TRACING: CPT

## 2020-10-08 PROCEDURE — 83735 ASSAY OF MAGNESIUM: CPT | Performed by: EMERGENCY MEDICINE

## 2020-10-08 PROCEDURE — 99283 EMERGENCY DEPT VISIT LOW MDM: CPT | Performed by: EMERGENCY MEDICINE

## 2020-10-08 PROCEDURE — 84484 ASSAY OF TROPONIN QUANT: CPT | Performed by: EMERGENCY MEDICINE

## 2020-10-09 LAB
ATRIAL RATE: 86 BPM
QRS AXIS: 78 DEGREES
QRSD INTERVAL: 82 MS
QT INTERVAL: 384 MS
QTC INTERVAL: 428 MS
T WAVE AXIS: 70 DEGREES
VENTRICULAR RATE: 75 BPM

## 2020-10-09 PROCEDURE — 93010 ELECTROCARDIOGRAM REPORT: CPT | Performed by: INTERNAL MEDICINE

## 2020-10-13 LAB
BACTERIA BLD CULT: NORMAL
BACTERIA BLD CULT: NORMAL

## 2020-12-01 ENCOUNTER — APPOINTMENT (EMERGENCY)
Dept: CT IMAGING | Facility: HOSPITAL | Age: 85
End: 2020-12-01
Payer: COMMERCIAL

## 2020-12-01 ENCOUNTER — HOSPITAL ENCOUNTER (EMERGENCY)
Facility: HOSPITAL | Age: 85
Discharge: HOME | End: 2020-12-01
Attending: EMERGENCY MEDICINE
Payer: COMMERCIAL

## 2020-12-01 VITALS
OXYGEN SATURATION: 97 % | RESPIRATION RATE: 18 BRPM | HEART RATE: 66 BPM | DIASTOLIC BLOOD PRESSURE: 63 MMHG | SYSTOLIC BLOOD PRESSURE: 118 MMHG | BODY MASS INDEX: 20.19 KG/M2 | WEIGHT: 149.03 LBS | TEMPERATURE: 97 F | HEIGHT: 72 IN
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
## 2020-12-01 DIAGNOSIS — S09.90XA CLOSED HEAD INJURY, INITIAL ENCOUNTER: ICD-10-CM

## 2020-12-01 PROCEDURE — 99284 EMERGENCY DEPT VISIT MOD MDM: CPT | Performed by: EMERGENCY MEDICINE

## 2020-12-01 PROCEDURE — 70450 CT HEAD/BRAIN W/O DYE: CPT

## 2020-12-01 PROCEDURE — 99284 EMERGENCY DEPT VISIT MOD MDM: CPT

## 2020-12-01 PROCEDURE — G1004 CDSM NDSC: HCPCS

## 2020-12-01 RX ORDER — POTASSIUM CHLORIDE 750 MG/1
TABLET, EXTENDED RELEASE ORAL DAILY
COMMUNITY
Start: 2020-09-14 | End: 2020-12-10 | Stop reason: HOSPADM

## 2020-12-01 RX ORDER — TAMSULOSIN HYDROCHLORIDE 0.4 MG/1
CAPSULE ORAL
COMMUNITY
End: 2020-12-10 | Stop reason: HOSPADM

## 2020-12-01 RX ORDER — OXYBUTYNIN CHLORIDE 15 MG/1
TABLET, EXTENDED RELEASE ORAL
COMMUNITY
End: 2020-12-10 | Stop reason: HOSPADM

## 2020-12-01 RX ORDER — WARFARIN SODIUM 5 MG/1
TABLET ORAL
COMMUNITY
Start: 2020-07-27 | End: 2020-12-10 | Stop reason: HOSPADM

## 2020-12-08 ENCOUNTER — HOSPITAL ENCOUNTER (INPATIENT)
Facility: HOSPITAL | Age: 85
LOS: 2 days | Discharge: HOSPICE | DRG: 871 | End: 2020-12-10
Attending: EMERGENCY MEDICINE | Admitting: HOSPITALIST
Payer: COMMERCIAL

## 2020-12-08 ENCOUNTER — APPOINTMENT (EMERGENCY)
Dept: RADIOLOGY | Facility: HOSPITAL | Age: 85
DRG: 871 | End: 2020-12-08
Payer: COMMERCIAL

## 2020-12-08 DIAGNOSIS — N17.0 ACUTE RENAL FAILURE WITH ACUTE TUBULAR NECROSIS SUPERIMPOSED ON STAGE 3B CHRONIC KIDNEY DISEASE (HCC): ICD-10-CM

## 2020-12-08 DIAGNOSIS — R53.1 GENERALIZED WEAKNESS: ICD-10-CM

## 2020-12-08 DIAGNOSIS — N39.0 UTI (URINARY TRACT INFECTION): Primary | ICD-10-CM

## 2020-12-08 DIAGNOSIS — D64.9 ANEMIA: ICD-10-CM

## 2020-12-08 DIAGNOSIS — A41.9 SEPSIS (HCC): ICD-10-CM

## 2020-12-08 DIAGNOSIS — R19.5 GUAIAC POSITIVE STOOLS: ICD-10-CM

## 2020-12-08 DIAGNOSIS — N18.32 ACUTE RENAL FAILURE WITH ACUTE TUBULAR NECROSIS SUPERIMPOSED ON STAGE 3B CHRONIC KIDNEY DISEASE (HCC): ICD-10-CM

## 2020-12-08 DIAGNOSIS — Z51.5 END OF LIFE CARE: ICD-10-CM

## 2020-12-08 PROBLEM — R33.8 BENIGN PROSTATIC HYPERPLASIA WITH URINARY RETENTION: Status: ACTIVE | Noted: 2020-12-08

## 2020-12-08 PROBLEM — R65.20 SEPSIS WITH ACUTE HYPOXIC RESPIRATORY FAILURE WITHOUT SEPTIC SHOCK (HCC): Status: ACTIVE | Noted: 2020-12-08

## 2020-12-08 PROBLEM — Z72.0 TOBACCO USE: Status: ACTIVE | Noted: 2020-12-08

## 2020-12-08 PROBLEM — J96.01 ACUTE RESPIRATORY FAILURE WITH HYPOXIA (HCC): Status: ACTIVE | Noted: 2020-12-08

## 2020-12-08 PROBLEM — N30.01 ACUTE CYSTITIS WITH HEMATURIA: Status: ACTIVE | Noted: 2020-12-08

## 2020-12-08 PROBLEM — I10 ESSENTIAL HYPERTENSION: Status: ACTIVE | Noted: 2020-12-08

## 2020-12-08 PROBLEM — I48.0 PAROXYSMAL ATRIAL FIBRILLATION (HCC): Status: ACTIVE | Noted: 2020-12-08

## 2020-12-08 PROBLEM — J96.01 SEPSIS WITH ACUTE HYPOXIC RESPIRATORY FAILURE WITHOUT SEPTIC SHOCK (HCC): Status: ACTIVE | Noted: 2020-12-08

## 2020-12-08 PROBLEM — R79.1 SUPRATHERAPEUTIC INR: Status: ACTIVE | Noted: 2020-12-08

## 2020-12-08 PROBLEM — L81.9: Status: ACTIVE | Noted: 2020-12-08

## 2020-12-08 PROBLEM — N40.1 BENIGN PROSTATIC HYPERPLASIA WITH URINARY RETENTION: Status: ACTIVE | Noted: 2020-12-08

## 2020-12-08 PROBLEM — D63.1 ANEMIA DUE TO STAGE 3B CHRONIC KIDNEY DISEASE (HCC): Status: ACTIVE | Noted: 2020-12-08

## 2020-12-08 PROBLEM — T14.8XXA MULTIPLE WOUNDS OF SKIN: Status: ACTIVE | Noted: 2020-12-08

## 2020-12-08 LAB
ABO GROUP BLD: NORMAL
ABO GROUP BLD: NORMAL
ALBUMIN SERPL BCP-MCNC: 2.6 G/DL (ref 3.5–5.7)
ALP SERPL-CCNC: 68 U/L (ref 55–165)
ALT SERPL W P-5'-P-CCNC: 4 U/L (ref 7–52)
ANION GAP SERPL CALCULATED.3IONS-SCNC: 10 MMOL/L (ref 4–13)
ANISOCYTOSIS BLD QL SMEAR: PRESENT
APTT PPP: 161 SECONDS (ref 23–37)
AST SERPL W P-5'-P-CCNC: 14 U/L (ref 13–39)
ATRIAL RATE: 63 BPM
BACTERIA UR QL AUTO: ABNORMAL /HPF
BASE EX.OXY STD BLDV CALC-SCNC: 73.1 %
BASE EXCESS BLDV CALC-SCNC: -0.8 MMOL/L
BILIRUB SERPL-MCNC: 0.4 MG/DL (ref 0.2–1)
BILIRUB UR QL STRIP: NEGATIVE
BLD GP AB SCN SERPL QL: NEGATIVE
BUN SERPL-MCNC: 88 MG/DL (ref 7–25)
CALCIUM ALBUM COR SERPL-MCNC: 9.1 MG/DL (ref 8.3–10.1)
CALCIUM SERPL-MCNC: 8 MG/DL (ref 8.6–10.5)
CHLORIDE SERPL-SCNC: 108 MMOL/L (ref 98–107)
CLARITY UR: ABNORMAL
CO2 SERPL-SCNC: 25 MMOL/L (ref 21–31)
COLOR UR: YELLOW
CREAT SERPL-MCNC: 2.1 MG/DL (ref 0.7–1.3)
ERYTHROCYTE [DISTWIDTH] IN BLOOD BY AUTOMATED COUNT: 18.5 % (ref 11.5–14.5)
FLUAV RNA RESP QL NAA+PROBE: NEGATIVE
FLUBV RNA RESP QL NAA+PROBE: NEGATIVE
GFR SERPL CREATININE-BSD FRML MDRD: 27 ML/MIN/1.73SQ M
GLUCOSE SERPL-MCNC: 115 MG/DL (ref 65–99)
GLUCOSE UR STRIP-MCNC: NEGATIVE MG/DL
HCO3 BLDV-SCNC: 24.5 MMOL/L (ref 24–30)
HCT VFR BLD AUTO: 18.5 % (ref 42–47)
HGB BLD-MCNC: 5.8 G/DL (ref 14–18)
HGB UR QL STRIP.AUTO: ABNORMAL
HYPERCHROMIA BLD QL SMEAR: PRESENT
INR PPP: 13.24 (ref 0.84–1.19)
KETONES UR STRIP-MCNC: NEGATIVE MG/DL
LACTATE SERPL-SCNC: 1.1 MMOL/L (ref 0.5–2)
LACTATE SERPL-SCNC: 2.5 MMOL/L (ref 0.5–2)
LEUKOCYTE ESTERASE UR QL STRIP: ABNORMAL
LYMPHOCYTES # BLD AUTO: 0.75 THOUSAND/UL (ref 0.6–4.47)
LYMPHOCYTES # BLD AUTO: 4 % (ref 20–51)
MCH RBC QN AUTO: 25.6 PG (ref 26–34)
MCHC RBC AUTO-ENTMCNC: 31.5 G/DL (ref 31–37)
MCV RBC AUTO: 81 FL (ref 81–99)
MICROCYTES BLD QL AUTO: PRESENT
MONOCYTES # BLD AUTO: 0.38 THOUSAND/UL (ref 0–1.22)
MONOCYTES NFR BLD AUTO: 2 % (ref 4–12)
NEUTS BAND NFR BLD MANUAL: 3 % (ref 0–8)
NEUTS SEG # BLD: 17.67 THOUSAND/UL (ref 1.81–6.82)
NEUTS SEG NFR BLD AUTO: 91 % (ref 42–75)
NITRITE UR QL STRIP: POSITIVE
NON-SQ EPI CELLS URNS QL MICRO: ABNORMAL /HPF
O2 CT BLDV-SCNC: 5.8 ML/DL
PCO2 BLDV: 46.4 MM HG
PH BLDV: 7.34 [PH] (ref 7.3–7.4)
PH UR STRIP.AUTO: 6 [PH]
PLATELET # BLD AUTO: 336 THOUSANDS/UL (ref 149–390)
PLATELET BLD QL SMEAR: ADEQUATE
PMV BLD AUTO: 7.1 FL (ref 8.6–11.7)
PO2 BLDV: 50 MM HG (ref 35–45)
POTASSIUM SERPL-SCNC: 4.1 MMOL/L (ref 3.5–5.5)
PROT SERPL-MCNC: 6.3 G/DL (ref 6.4–8.9)
PROT UR STRIP-MCNC: NEGATIVE MG/DL
PROTHROMBIN TIME: 97 SECONDS (ref 11.6–14.5)
QRS AXIS: 77 DEGREES
QRSD INTERVAL: 84 MS
QT INTERVAL: 406 MS
QTC INTERVAL: 462 MS
RBC # BLD AUTO: 2.27 MILLION/UL (ref 4.3–5.9)
RBC #/AREA URNS AUTO: ABNORMAL /HPF
RBC MORPH BLD: PRESENT
RH BLD: POSITIVE
RH BLD: POSITIVE
RSV RNA RESP QL NAA+PROBE: NEGATIVE
SARS-COV-2 RNA RESP QL NAA+PROBE: NEGATIVE
SODIUM SERPL-SCNC: 143 MMOL/L (ref 134–143)
SP GR UR STRIP.AUTO: 1.01 (ref 1–1.03)
SPECIMEN EXPIRATION DATE: NORMAL
T WAVE AXIS: 59 DEGREES
TARGETS BLD QL SMEAR: PRESENT
TOTAL CELLS COUNTED SPEC: 100
TROPONIN I SERPL-MCNC: <0.03 NG/ML
UROBILINOGEN UR QL STRIP.AUTO: 0.2 E.U./DL
VENTRICULAR RATE: 78 BPM
WBC # BLD AUTO: 18.8 THOUSAND/UL (ref 4.8–10.8)
WBC #/AREA URNS AUTO: ABNORMAL /HPF

## 2020-12-08 PROCEDURE — 86920 COMPATIBILITY TEST SPIN: CPT

## 2020-12-08 PROCEDURE — C9113 INJ PANTOPRAZOLE SODIUM, VIA: HCPCS | Performed by: NURSE PRACTITIONER

## 2020-12-08 PROCEDURE — 85730 THROMBOPLASTIN TIME PARTIAL: CPT | Performed by: EMERGENCY MEDICINE

## 2020-12-08 PROCEDURE — 80053 COMPREHEN METABOLIC PANEL: CPT | Performed by: EMERGENCY MEDICINE

## 2020-12-08 PROCEDURE — 85007 BL SMEAR W/DIFF WBC COUNT: CPT | Performed by: EMERGENCY MEDICINE

## 2020-12-08 PROCEDURE — 94664 DEMO&/EVAL PT USE INHALER: CPT

## 2020-12-08 PROCEDURE — 85610 PROTHROMBIN TIME: CPT | Performed by: EMERGENCY MEDICINE

## 2020-12-08 PROCEDURE — 96365 THER/PROPH/DIAG IV INF INIT: CPT

## 2020-12-08 PROCEDURE — 83605 ASSAY OF LACTIC ACID: CPT | Performed by: EMERGENCY MEDICINE

## 2020-12-08 PROCEDURE — 87186 SC STD MICRODIL/AGAR DIL: CPT | Performed by: EMERGENCY MEDICINE

## 2020-12-08 PROCEDURE — 86850 RBC ANTIBODY SCREEN: CPT | Performed by: EMERGENCY MEDICINE

## 2020-12-08 PROCEDURE — 84145 PROCALCITONIN (PCT): CPT | Performed by: EMERGENCY MEDICINE

## 2020-12-08 PROCEDURE — 99291 CRITICAL CARE FIRST HOUR: CPT | Performed by: EMERGENCY MEDICINE

## 2020-12-08 PROCEDURE — 36430 TRANSFUSION BLD/BLD COMPNT: CPT

## 2020-12-08 PROCEDURE — 86901 BLOOD TYPING SEROLOGIC RH(D): CPT | Performed by: EMERGENCY MEDICINE

## 2020-12-08 PROCEDURE — 86900 BLOOD TYPING SEROLOGIC ABO: CPT | Performed by: EMERGENCY MEDICINE

## 2020-12-08 PROCEDURE — 30233N1 TRANSFUSION OF NONAUTOLOGOUS RED BLOOD CELLS INTO PERIPHERAL VEIN, PERCUTANEOUS APPROACH: ICD-10-PCS | Performed by: HOSPITALIST

## 2020-12-08 PROCEDURE — 84484 ASSAY OF TROPONIN QUANT: CPT | Performed by: EMERGENCY MEDICINE

## 2020-12-08 PROCEDURE — 85027 COMPLETE CBC AUTOMATED: CPT | Performed by: EMERGENCY MEDICINE

## 2020-12-08 PROCEDURE — 93010 ELECTROCARDIOGRAM REPORT: CPT | Performed by: INTERNAL MEDICINE

## 2020-12-08 PROCEDURE — 84145 PROCALCITONIN (PCT): CPT | Performed by: NURSE PRACTITIONER

## 2020-12-08 PROCEDURE — 71045 X-RAY EXAM CHEST 1 VIEW: CPT

## 2020-12-08 PROCEDURE — 82728 ASSAY OF FERRITIN: CPT | Performed by: NURSE PRACTITIONER

## 2020-12-08 PROCEDURE — 93005 ELECTROCARDIOGRAM TRACING: CPT

## 2020-12-08 PROCEDURE — 82805 BLOOD GASES W/O2 SATURATION: CPT | Performed by: EMERGENCY MEDICINE

## 2020-12-08 PROCEDURE — 36415 COLL VENOUS BLD VENIPUNCTURE: CPT | Performed by: EMERGENCY MEDICINE

## 2020-12-08 PROCEDURE — 99223 1ST HOSP IP/OBS HIGH 75: CPT | Performed by: NURSE PRACTITIONER

## 2020-12-08 PROCEDURE — 94760 N-INVAS EAR/PLS OXIMETRY 1: CPT

## 2020-12-08 PROCEDURE — 96361 HYDRATE IV INFUSION ADD-ON: CPT

## 2020-12-08 PROCEDURE — 87077 CULTURE AEROBIC IDENTIFY: CPT | Performed by: EMERGENCY MEDICINE

## 2020-12-08 PROCEDURE — 0241U HB NFCT DS VIR RESP RNA 4 TRGT: CPT | Performed by: EMERGENCY MEDICINE

## 2020-12-08 PROCEDURE — 81001 URINALYSIS AUTO W/SCOPE: CPT | Performed by: EMERGENCY MEDICINE

## 2020-12-08 PROCEDURE — 83540 ASSAY OF IRON: CPT | Performed by: NURSE PRACTITIONER

## 2020-12-08 PROCEDURE — 87086 URINE CULTURE/COLONY COUNT: CPT | Performed by: EMERGENCY MEDICINE

## 2020-12-08 PROCEDURE — 99285 EMERGENCY DEPT VISIT HI MDM: CPT

## 2020-12-08 PROCEDURE — P9016 RBC LEUKOCYTES REDUCED: HCPCS

## 2020-12-08 PROCEDURE — 83550 IRON BINDING TEST: CPT | Performed by: NURSE PRACTITIONER

## 2020-12-08 PROCEDURE — 87040 BLOOD CULTURE FOR BACTERIA: CPT | Performed by: EMERGENCY MEDICINE

## 2020-12-08 RX ORDER — ACETAMINOPHEN 325 MG/1
650 TABLET ORAL EVERY 6 HOURS PRN
Status: DISCONTINUED | OUTPATIENT
Start: 2020-12-08 | End: 2020-12-09

## 2020-12-08 RX ORDER — CEFEPIME HYDROCHLORIDE 1 G/1
1000 INJECTION, POWDER, FOR SOLUTION INTRAMUSCULAR; INTRAVENOUS EVERY 12 HOURS
Status: DISCONTINUED | OUTPATIENT
Start: 2020-12-08 | End: 2020-12-08

## 2020-12-08 RX ORDER — CEFEPIME HYDROCHLORIDE 1 G/50ML
1000 INJECTION, SOLUTION INTRAVENOUS EVERY 12 HOURS
Status: DISCONTINUED | OUTPATIENT
Start: 2020-12-09 | End: 2020-12-09

## 2020-12-08 RX ORDER — OXYBUTYNIN CHLORIDE 5 MG/1
15 TABLET, EXTENDED RELEASE ORAL
Status: DISCONTINUED | OUTPATIENT
Start: 2020-12-08 | End: 2020-12-09

## 2020-12-08 RX ORDER — ALBUTEROL SULFATE 90 UG/1
1 AEROSOL, METERED RESPIRATORY (INHALATION) 4 TIMES DAILY
Status: DISCONTINUED | OUTPATIENT
Start: 2020-12-08 | End: 2020-12-08

## 2020-12-08 RX ORDER — SODIUM CHLORIDE 9 MG/ML
75 INJECTION, SOLUTION INTRAVENOUS CONTINUOUS
Status: DISCONTINUED | OUTPATIENT
Start: 2020-12-08 | End: 2020-12-09

## 2020-12-08 RX ORDER — ONDANSETRON 2 MG/ML
4 INJECTION INTRAMUSCULAR; INTRAVENOUS EVERY 6 HOURS PRN
Status: DISCONTINUED | OUTPATIENT
Start: 2020-12-08 | End: 2020-12-09

## 2020-12-08 RX ORDER — CEFEPIME HYDROCHLORIDE 2 G/50ML
2000 INJECTION, SOLUTION INTRAVENOUS ONCE
Status: COMPLETED | OUTPATIENT
Start: 2020-12-08 | End: 2020-12-08

## 2020-12-08 RX ORDER — NICOTINE 21 MG/24HR
1 PATCH, TRANSDERMAL 24 HOURS TRANSDERMAL DAILY
Status: DISCONTINUED | OUTPATIENT
Start: 2020-12-08 | End: 2020-12-09

## 2020-12-08 RX ORDER — URSODIOL 300 MG/1
300 CAPSULE ORAL 3 TIMES DAILY
Status: DISCONTINUED | OUTPATIENT
Start: 2020-12-08 | End: 2020-12-09

## 2020-12-08 RX ORDER — TAMSULOSIN HYDROCHLORIDE 0.4 MG/1
0.4 CAPSULE ORAL
Status: DISCONTINUED | OUTPATIENT
Start: 2020-12-08 | End: 2020-12-09

## 2020-12-08 RX ORDER — PANTOPRAZOLE SODIUM 40 MG/1
40 INJECTION, POWDER, FOR SOLUTION INTRAVENOUS EVERY 12 HOURS SCHEDULED
Status: DISCONTINUED | OUTPATIENT
Start: 2020-12-08 | End: 2020-12-09

## 2020-12-08 RX ORDER — CHLORHEXIDINE GLUCONATE 0.12 MG/ML
15 RINSE ORAL EVERY 12 HOURS SCHEDULED
Status: DISCONTINUED | OUTPATIENT
Start: 2020-12-08 | End: 2020-12-09

## 2020-12-08 RX ADMIN — TAMSULOSIN HYDROCHLORIDE 0.4 MG: 0.4 CAPSULE ORAL at 23:33

## 2020-12-08 RX ADMIN — NICOTINE 1 PATCH: 14 PATCH, EXTENDED RELEASE TRANSDERMAL at 23:30

## 2020-12-08 RX ADMIN — CHLORHEXIDINE GLUCONATE 0.12% ORAL RINSE 15 ML: 1.2 LIQUID ORAL at 23:37

## 2020-12-08 RX ADMIN — CARBIDOPA AND LEVODOPA 1 TABLET: 25; 100 TABLET ORAL at 23:33

## 2020-12-08 RX ADMIN — URSODIOL 300 MG: 300 CAPSULE ORAL at 23:34

## 2020-12-08 RX ADMIN — OXYBUTYNIN CHLORIDE 15 MG: 5 TABLET, EXTENDED RELEASE ORAL at 23:32

## 2020-12-08 RX ADMIN — PHYTONADIONE 10 MG: 10 INJECTION, EMULSION INTRAMUSCULAR; INTRAVENOUS; SUBCUTANEOUS at 17:50

## 2020-12-08 RX ADMIN — SODIUM CHLORIDE 1000 ML: 0.9 INJECTION, SOLUTION INTRAVENOUS at 16:26

## 2020-12-08 RX ADMIN — CEFEPIME HYDROCHLORIDE 2000 MG: 2 INJECTION, SOLUTION INTRAVENOUS at 15:47

## 2020-12-08 RX ADMIN — SODIUM CHLORIDE 1000 ML: 0.9 INJECTION, SOLUTION INTRAVENOUS at 15:47

## 2020-12-08 RX ADMIN — PANTOPRAZOLE SODIUM 40 MG: 40 INJECTION, POWDER, FOR SOLUTION INTRAVENOUS at 23:41

## 2020-12-08 NOTE — PLAN OF CARE
Problem: HEMATOLOGIC - ADULT  Goal: Maintains hematologic stability  Description: INTERVENTIONS  - Assess for signs and symptoms of bleeding or hemorrhage  - Monitor labs  - Administer supportive blood products/factors as ordered and appropriate  Outcome: Progressing     Problem: SKIN/TISSUE INTEGRITY - ADULT  Goal: Skin integrity remains intact  Description: INTERVENTIONS  - Identify patients at risk for skin breakdown  - Assess and monitor skin integrity  - Assess and monitor nutrition and hydration status  - Monitor labs (i e  albumin)  - Assess for incontinence   - Turn and reposition patient  - Assist with mobility/ambulation  - Relieve pressure over bony prominences  - Avoid friction and shearing  - Provide appropriate hygiene as needed including keeping skin clean and dry  - Evaluate need for skin moisturizer/barrier cream  - Collaborate with interdisciplinary team (i e  Nutrition, Rehabilitation, etc )   - Patient/family teaching  Outcome: Progressing  Goal: Incision(s), wounds(s) or drain site(s) healing without S/S of infection  Description: INTERVENTIONS  - Assess and document risk factors for skin impairment   - Assess and document dressing, incision, wound bed, drain sites and surrounding tissue  - Consider nutrition services referral as needed  - Oral mucous membranes remain intact  - Provide patient/ family education  Outcome: Progressing     Problem: PAIN - ADULT  Goal: Verbalizes/displays adequate comfort level or baseline comfort level  Description: Interventions:  - Encourage patient to monitor pain and request assistance  - Assess pain using appropriate pain scale  - Administer analgesics based on type and severity of pain and evaluate response  - Implement non-pharmacological measures as appropriate and evaluate response  - Consider cultural and social influences on pain and pain management  - Notify physician/advanced practitioner if interventions unsuccessful or patient reports new pain  Outcome: Progressing

## 2020-12-08 NOTE — ED PROVIDER NOTES
History  Chief Complaint   Patient presents with    Weakness - Generalized     This is a 80-year-old man who comes in for generalized weakness  Patient has had multiple episodes of urinary tract infection recently for which he discontinued his amoxicillin 2 days prior  Patient has become more weak since then  At baseline patient is extremely interactive in able to ambulate with cane or walker  Earlier today the patient is extremely weak and his daughter found him unable to care for himself  She called 911  Patient was initially hypoxic to 88% on room air pulse hypotensive  Responded well to 5 L nasal cannula and 0 5 L of normal saline  Patient states that he has no complaints knows that he is in a hospital in [facility] but does not know further details of how he came to be here  Prior to Admission Medications   Prescriptions Last Dose Informant Patient Reported? Taking?    OXYBUTYNIN TD  Self Yes No   Sig: Place on the skin   albuterol (PROAIR HFA) 90 mcg/act inhaler  Self Yes No   Sig: ProAir HFA 90 mcg/actuation aerosol inhaler   carbidopa-levodopa (SINEMET)  mg per tablet   Yes No   Sig: Take by mouth every 12 (twelve) hours   furosemide (LASIX) 40 mg tablet  Self Yes No   Sig: furosemide 40 mg tablet   take 1 tablet by mouth once daily   oxybutynin (DITROPAN XL) 15 MG 24 hr tablet   Yes No   Sig: oxybutynin chloride ER 15 mg tablet,extended release 24 hr   potassium chloride (K-DUR,KLOR-CON) 10 mEq tablet   Yes No   Sig: Take by mouth Daily   potassium chloride (MICRO-K) 10 MEQ CR capsule  Self Yes No   Sig: potassium chloride ER 10 mEq capsule,extended release   tamsulosin (FLOMAX) 0 4 mg   Yes No   Sig: tamsulosin 0 4 mg capsule   ursodiol (ACTIGALL) 250 mg tablet   Yes No   Sig: Take 250 mg by mouth 3 (three) times a day   verapamil (CALAN-SR) 180 mg CR tablet  Self Yes No   Sig: verapamil ER (SR) 180 mg tablet,extended release   warfarin (Coumadin) 5 mg tablet   Yes No   Sig: TAKE 1 TABLET DAILY BUT MAYTAKE MORE AT TIMES AS      DIRECTED BY YOUR PHYSICIAN      Facility-Administered Medications: None       Past Medical History:   Diagnosis Date    A-fib ([facility] )     BPH (benign prostatic hyperplasia)     Cholelithiasis     Hypertension     Prostate cancer ([facility] )     Renal disorder        Past Surgical History:   Procedure Laterality Date    TONSILLECTOMY         History reviewed  No pertinent family history  I have reviewed and agree with the history as documented  E-Cigarette/Vaping     E-Cigarette/Vaping Substances     Social History     Tobacco Use    Smoking status: Current Some Day Smoker     Packs/day: 0 50     Types: Cigarettes    Smokeless tobacco: Never Used   Substance Use Topics    Alcohol use: Not Currently    Drug use: Never       Review of Systems   Unable to perform ROS: Dementia       Physical Exam  Physical Exam  Constitutional:       Appearance: He is well-developed  He is ill-appearing  HENT:      Head: Normocephalic and atraumatic  Eyes:      Conjunctiva/sclera: Conjunctivae normal       Pupils: Pupils are equal, round, and reactive to light  Neck:      Musculoskeletal: Normal range of motion and neck supple  Cardiovascular:      Rate and Rhythm: Normal rate and regular rhythm  Heart sounds: Normal heart sounds  Pulmonary:      Effort: Pulmonary effort is normal  No respiratory distress  Breath sounds: Normal breath sounds  No stridor  No wheezing or rales  Abdominal:      General: Bowel sounds are normal  There is no distension  Palpations: Abdomen is soft  Tenderness: There is abdominal tenderness (suprapubic)  There is no right CVA tenderness, left CVA tenderness, guarding or rebound  Genitourinary:     Penis: Normal        Rectum: Guaiac result positive  Musculoskeletal: Normal range of motion  General: No deformity  Skin:     General: Skin is warm and dry  Findings: Lesion present        Comments: Multiple healing ulcers what appears to be a healed cellulitis which is covered in Vaseline gauze  Neurological:      General: No focal deficit present  Mental Status: He is alert  Comments: Moves all extremities, no facial droop, generalized weakness appreciated, mild confusion, sensation to touch appears to be intact   Psychiatric:         Behavior: Behavior normal          Thought Content:  Thought content normal          Judgment: Judgment normal          Vital Signs  ED Triage Vitals [12/08/20 1554]   Temperature Pulse Respirations Blood Pressure SpO2   98 6 °F (37 °C) 88 22 112/86 (!) 88 %      Temp Source Heart Rate Source Patient Position - Orthostatic VS BP Location FiO2 (%)   Tympanic Monitor Sitting Left arm --      Pain Score       --           Vitals:    12/08/20 2115 12/08/20 2130 12/08/20 2145 12/08/20 2200   BP: 92/58 104/51 109/56    Pulse: 71 73 73 72   Patient Position - Orthostatic VS:             Visual Acuity      ED Medications  Medications   carbidopa-levodopa (SINEMET)  mg per tablet 1 tablet (has no administration in time range)   ursodiol (ACTIGALL) capsule 300 mg (has no administration in time range)   albuterol (PROVENTIL HFA,VENTOLIN HFA) inhaler 1 puff (has no administration in time range)   oxybutynin (DITROPAN-XL) 24 hr tablet 15 mg (has no administration in time range)   tamsulosin (FLOMAX) capsule 0 4 mg (has no administration in time range)   verapamil (CALAN-SR) CR tablet 180 mg (has no administration in time range)   chlorhexidine (PERIDEX) 0 12 % oral rinse 15 mL (has no administration in time range)   sodium chloride 0 9 % infusion (has no administration in time range)   acetaminophen (TYLENOL) tablet 650 mg (has no administration in time range)   ondansetron (ZOFRAN) injection 4 mg (has no administration in time range)   nicotine (NICODERM CQ) 14 mg/24hr TD 24 hr patch 1 patch (has no administration in time range)   pantoprazole (PROTONIX) injection 40 mg (has no administration in time range)   cefepime (MAXIPIME) IVPB (premix in dextrose) 1,000 mg 50 mL (has no administration in time range)   sodium chloride 0 9 % bolus 1,000 mL (0 mL Intravenous Stopped 12/8/20 1617)     Followed by   sodium chloride 0 9 % bolus 1,000 mL (0 mL Intravenous Stopped 12/8/20 1726)   cefepime (MAXIPIME) IVPB (premix in dextrose) 2,000 mg 50 mL (0 mg Intravenous Stopped 12/8/20 1623)   phytonadione (AQUA-MEPHYTON) 10 mg/mL 10 mg in sodium chloride 0 9 % 50 mL IVPB (0 mg Intravenous Stopped 12/8/20 1826)       Diagnostic Studies  Results Reviewed     Procedure Component Value Units Date/Time    Lactic acid 2 Hours [[id number]]  (Normal) Collected: 12/08/20 1629    Lab Status: Final result Specimen: Blood from Arm, Right Updated: 12/08/20 1910     LACTIC ACID 1 1 mmol/L     Narrative:      Result may be elevated if tourniquet was used during collection  Urine Microscopic [[id number]]  (Abnormal) Collected: 12/08/20 1536    Lab Status: Final result Specimen: Urine, Straight Cath Updated: 12/08/20 1757     RBC, UA 10-20 /hpf      WBC, UA 30-50 /hpf      Epithelial Cells Occasional /hpf      Bacteria, UA Moderate /hpf     Urine culture [[id number]] Collected: 12/08/20 1536    Lab Status:  In process Specimen: Urine, Straight Cath Updated: 12/08/20 1757    Manual Differential (Non Wam) [[id number]]  (Abnormal) Collected: 12/08/20 1535    Lab Status: Final result Specimen: Blood from Arm, Left Updated: 12/08/20 1707     Segmented % 91 %      Bands % 3 %      Lymphocytes % 4 %      Monocytes % 2 %      Neutrophils Absolute 17 67 Thousand/uL      Lymphocytes Absolute 0 75 Thousand/uL      Monocytes Absolute 0 38 Thousand/uL      Total Counted 100     RBC Morphology Present     Anisocytosis Present     Hypochromia Present     Microcytes Present     Target Cells Present     Platelet Estimate Adequate    Protime-INR [[id number]]  (Abnormal) Collected: 12/08/20 1629    Lab Status: Final result Specimen: Blood from Arm, Right Updated: 12/08/20 1704     Protime 97 0 seconds      INR 13 24    APTT [[id number]]  (Abnormal) Collected: 12/08/20 1629    Lab Status: Final result Specimen: Blood from Arm, Right Updated: 12/08/20 1704      seconds     COVID19, Influenza A/B, RSV PCR, [facility] [[id number]]  (Normal) Collected: 12/08/20 1535    Lab Status: Final result Specimen: Nares from Nasopharyngeal Swab Updated: 12/08/20 1648     SARS-CoV-2 Negative     INFLUENZA A PCR Negative     INFLUENZA B PCR Negative     RSV PCR Negative    Narrative: This test has been authorized by FDA under an EUA (Emergency Use Assay) for use by authorized laboratories  Clinical caution and judgement should be used with the interpretation of these results with consideration of the clinical impression and other laboratory testing  Testing reported as "Positive" or "Negative" has been proven to be accurate according to standard laboratory validation requirements  All testing is performed with control materials showing appropriate reactivity at standard intervals  UA w Reflex to Microscopic w Reflex to Culture [[id number]]  (Abnormal) Collected: 12/08/20 1536    Lab Status: Final result Specimen: Urine, Straight Cath Updated: 12/08/20 1631     Color, UA Yellow     Clarity, UA Slightly Cloudy     Specific Gravity, UA 1 015     pH, UA 6 0     Leukocytes, UA 2+     Nitrite, UA Positive     Protein, UA Negative mg/dl      Glucose, UA Negative mg/dl      Ketones, UA Negative mg/dl      Urobilinogen, UA 0 2 E U /dl      Bilirubin, UA Negative     Blood, UA 3+    Lactic acid [[id number]]  (Abnormal) Collected: 12/08/20 1535    Lab Status: Final result Specimen: Blood from Arm, Left Updated: 12/08/20 1618     LACTIC ACID 2 5 mmol/L     Narrative:      Result may be elevated if tourniquet was used during collection      Comprehensive metabolic panel [[id number]]  (Abnormal) Collected: 12/08/20 1535    Lab Status: Final result Specimen: Blood from Arm, Left Updated: 12/08/20 1611     Sodium 143 mmol/L      Potassium 4 1 mmol/L      Chloride 108 mmol/L      CO2 25 mmol/L      ANION GAP 10 mmol/L      BUN 88 mg/dL      Creatinine 2 10 mg/dL      Glucose 115 mg/dL      Calcium 8 0 mg/dL      Corrected Calcium 9 1 mg/dL      AST 14 U/L      ALT 4 U/L      Alkaline Phosphatase 68 U/L      Total Protein 6 3 g/dL      Albumin 2 6 g/dL      Total Bilirubin 0 40 mg/dL      eGFR 27 ml/min/1 73sq m     Narrative:      National Kidney Disease Foundation guidelines for Chronic Kidney Disease (CKD):     Stage 1 with normal or high GFR (GFR > 90 mL/min/1 73 square meters)    Stage 2 Mild CKD (GFR = 60-89 mL/min/1 73 square meters)    Stage 3A Moderate CKD (GFR = 45-59 mL/min/1 73 square meters)    Stage 3B Moderate CKD (GFR = 30-44 mL/min/1 73 square meters)    Stage 4 Severe CKD (GFR = 15-29 mL/min/1 73 square meters)    Stage 5 End Stage CKD (GFR <15 mL/min/1 73 square meters)  Note: GFR calculation is accurate only with a steady state creatinine    Troponin I [[id number]]  (Normal) Collected: 12/08/20 1535    Lab Status: Final result Specimen: Blood from Arm, Left Updated: 12/08/20 1608     Troponin I <0 03 ng/mL     Blood gas, Venous [[id number]]  (Abnormal) Collected: 12/08/20 1535    Lab Status: Final result Specimen: Blood from Arm, Left Updated: 12/08/20 1607     pH, Sukumar 7 340     pCO2, Sukumar 46 4 mm Hg      pO2, Sukumar 50 0 mm Hg      HCO3, Sukumar 24 5 mmol/L      Base Excess, Sukumar -0 8 mmol/L      O2 Content, Sukumar 5 8 ml/dL      O2 HGB, VENOUS 73 1 %     CBC and differential [[id number]]  (Abnormal) Collected: 12/08/20 1535    Lab Status: Final result Specimen: Blood from Arm, Left Updated: 12/08/20 1604     WBC 18 80 Thousand/uL      RBC 2 27 Million/uL      Hemoglobin 5 8 g/dL      Hematocrit 18 5 %      MCV 81 fL      MCH 25 6 pg      MCHC 31 5 g/dL      RDW 18 5 %      MPV 7 1 fL      Platelets 648 Thousands/uL     Procalcitonin with AM Reflex [[id number]] Collected: 12/08/20 1535    Lab Status: In process Specimen: Blood from Arm, Left Updated: 12/08/20 1544    Blood culture #2 [[id number]] Collected: 12/08/20 1525    Lab Status: In process Specimen: Blood from Arm, Right Updated: 12/08/20 1544    Blood culture #1 [[id number]] Collected: 12/08/20 1535    Lab Status: In process Specimen: Blood from Arm, Left Updated: 12/08/20 1544                 XR chest portable   Final Result by Savi Arenas MD (12/08 1629)      No acute cardiopulmonary disease  Workstation performed: [id number]                    Procedures  CriticalCare Time  Performed by: David Bear MD  Authorized by: David Bear MD     Critical care provider statement:     Critical care time (minutes):  30    Critical care time was exclusive of:  Separately billable procedures and treating other patients and teaching time    Critical care was time spent personally by me on the following activities:  Blood draw for specimens, obtaining history from patient or surrogate, development of treatment plan with patient or surrogate, evaluation of patient's response to treatment, examination of patient, interpretation of cardiac output measurements, ordering and performing treatments and interventions, ordering and review of laboratory studies, ordering and review of radiographic studies, re-evaluation of patient's condition and review of old charts    I assumed direction of critical care for this patient from another provider in my specialty: no               ED Course  ED Course as of Dec 08 2206   Tue Dec 08, 2020   1612 Spoke to his daughter Geoff Farrar who states she has primary decision maker when the patient is incapacitated  She states that she understands the risks and benefits of a blood transfusion on authorize is the transfusion  She states DNR DNI with otherwise aggressive care  1741 Guaiac-positive, blood starting, vitamin K also being home                            Initial Sepsis Screening     Row Name 12/08/20 1921                Is the patient's history suggestive of a new or worsening infection? (!) Yes (Proceed)  -KM        Suspected source of infection  urinary tract infection  -KM        Are two or more of the following signs & symptoms of infection both present and new to the patient? (!) Yes (Proceed)  -KM        Indicate SIRS criteria  Tachypnea > 20 resp per min;Leukocytosis (WBC > 50392 IJL)  -KM        If the answer is yes to both questions, suspicion of sepsis is present          If severe sepsis is present AND tissue hypoperfusion perists in the hour after fluid resuscitation or lactate > 4, the patient meets criteria for SEPTIC SHOCK          Are any of the following organ dysfunction criteria present within 6 hours of suspected infection and SIRS criteria that are NOT considered to be chronic conditions? (!) Yes  -KM        Organ dysfunction  INR > 1 5 or aPTT > 60 secs;Creatinine > 2 0 mg/dL; Lactate > 2 0 mmol/L  -KM        Date of presentation of severe sepsis  12/08/20  -KM        Time of presentation of severe sepsis  1921  -KM        Tissue hypoperfusion persists in the hour after crystalloid fluid administration, evidenced, by either:          Was hypotension present within one hour of the conclusion of crystalloid fluid administration? No  -KM        Date of presentation of septic shock          Time of presentation of septic shock            User Key  (r) = Recorded By, (t) = Taken By, (c) = Cosigned By    [address] Name Provider Type    Gertrude Simon, [address] Myles Harris Nurse Practitioner                      MDM  Number of Diagnoses or Management Options  Anemia: new and requires workup  Sepsis [facility]): new and requires workup  UTI (urinary tract infection): new and requires workup  Diagnosis management comments:    This is a 19-year-old  Male with sepsis secondary to urinary tract infection who also has elevated INR secondary to warfarin with positive guaiac test but no jayme bleeding  Patient started on blood transfusion and given vitamin K  Patient given nasal cannula and saline which he has responded well to  Patient started on cefepime  Patient is significantly improved from his time of arrival   Admitted to the ICU  Patient is DNR DNI with otherwise aggressive care per his daughter  Amount and/or Complexity of Data Reviewed  Clinical lab tests: ordered and reviewed  Tests in the radiology section of CPT®: reviewed and ordered  Discuss the patient with other providers: yes  Independent visualization of images, tracings, or specimens: yes        Disposition  Final diagnoses:   UTI (urinary tract infection)   Sepsis ([facility] )   Anemia     Time reflects when diagnosis was documented in both MDM as applicable and the Disposition within this note     Time User Action Codes Description Comment    12/8/2020  5:25 PM Marvene Glaze Add [N39 0] UTI (urinary tract infection)     12/8/2020  5:25 PM Marvene Glaze Add [A41 9] [facility]epsis ([facility] )     12/8/2020  5:25 PM Rolley Feeler [D64 9] Anemia     12/8/2020  7:27 PM Meckes Pecsoren Winfield Add [R19 5] Guaiac positive stools     12/8/2020  7:31 PM Jairon Gomez [place] Add [R53 1] Generalized weakness       ED Disposition     ED Disposition Condition Date/Time Comment    Admit Stable Tue Dec 8, 2020  5:25 PM Case was discussed with barrett and the patient's admission status was agreed to be Admission Status: inpatient status to the service of Dr Jamel Rogers   Follow-up Information    None         Current Discharge Medication List      CONTINUE these medications which have NOT CHANGED    Details   albuterol (PROAIR HFA) 90 mcg/act inhaler ProAir HFA 90 mcg/actuation aerosol inhaler    Comments: Substitution to a formulary equivalent within the same pharmaceutical class is authorized        carbidopa-levodopa (SINEMET)  mg per tablet Take by mouth every 12 (twelve) hours      furosemide (LASIX) 40 mg tablet furosemide 40 mg tablet   take 1 tablet by mouth once daily      oxybutynin (DITROPAN XL) 15 MG 24 hr tablet oxybutynin chloride ER 15 mg tablet,extended release 24 hr      OXYBUTYNIN TD Place on the skin      potassium chloride (K-DUR,KLOR-CON) 10 mEq tablet Take by mouth Daily      potassium chloride (MICRO-K) 10 MEQ CR capsule potassium chloride ER 10 mEq capsule,extended release      tamsulosin (FLOMAX) 0 4 mg tamsulosin 0 4 mg capsule      ursodiol (ACTIGALL) 250 mg tablet Take 250 mg by mouth 3 (three) times a day      verapamil (CALAN-SR) 180 mg CR tablet verapamil ER (SR) 180 mg tablet,extended release      warfarin (Coumadin) 5 mg tablet TAKE 1 TABLET DAILY BUT MAYTAKE MORE AT TIMES AS      DIRECTED BY YOUR PHYSICIAN           No discharge procedures on file      PDMP Review     None          ED Provider  Electronically Signed by           Dae Foy MD  12/08/20 3327

## 2020-12-09 PROBLEM — Z71.89 GOALS OF CARE, COUNSELING/DISCUSSION: Status: ACTIVE | Noted: 2020-12-09

## 2020-12-09 PROBLEM — D62 ACUTE BLOOD LOSS ANEMIA: Status: ACTIVE | Noted: 2020-12-08

## 2020-12-09 LAB
ABO GROUP BLD BPU: NORMAL
ABO GROUP BLD BPU: NORMAL
ALBUMIN SERPL BCP-MCNC: 2.4 G/DL (ref 3.5–5.7)
ALP SERPL-CCNC: 63 U/L (ref 55–165)
ALT SERPL W P-5'-P-CCNC: <3 U/L (ref 7–52)
ANION GAP SERPL CALCULATED.3IONS-SCNC: 9 MMOL/L (ref 4–13)
AST SERPL W P-5'-P-CCNC: 13 U/L (ref 13–39)
BILIRUB SERPL-MCNC: 0.7 MG/DL (ref 0.2–1)
BPU ID: NORMAL
BPU ID: NORMAL
BUN SERPL-MCNC: 76 MG/DL (ref 7–25)
CALCIUM ALBUM COR SERPL-MCNC: 8.9 MG/DL (ref 8.3–10.1)
CALCIUM SERPL-MCNC: 7.6 MG/DL (ref 8.6–10.5)
CHLORIDE SERPL-SCNC: 114 MMOL/L (ref 98–107)
CO2 SERPL-SCNC: 24 MMOL/L (ref 21–31)
CREAT SERPL-MCNC: 1.8 MG/DL (ref 0.7–1.3)
CROSSMATCH: NORMAL
CROSSMATCH: NORMAL
ERYTHROCYTE [DISTWIDTH] IN BLOOD BY AUTOMATED COUNT: 17.1 % (ref 11.5–14.5)
FERRITIN SERPL-MCNC: 101 NG/ML (ref 8–388)
GFR SERPL CREATININE-BSD FRML MDRD: 32 ML/MIN/1.73SQ M
GLUCOSE SERPL-MCNC: 106 MG/DL (ref 65–99)
HCT VFR BLD AUTO: 24.4 % (ref 42–47)
HGB BLD-MCNC: 7.6 G/DL (ref 14–18)
HGB BLD-MCNC: 7.9 G/DL (ref 14–18)
INR PPP: 2.15 (ref 0.84–1.19)
IRON SATN MFR SERPL: 15 %
IRON SERPL-MCNC: 37 UG/DL (ref 65–175)
MCH RBC QN AUTO: 26.5 PG (ref 26–34)
MCHC RBC AUTO-ENTMCNC: 32.3 G/DL (ref 31–37)
MCV RBC AUTO: 82 FL (ref 81–99)
PLATELET # BLD AUTO: 271 THOUSANDS/UL (ref 149–390)
PMV BLD AUTO: 7.4 FL (ref 8.6–11.7)
POTASSIUM SERPL-SCNC: 4.3 MMOL/L (ref 3.5–5.5)
PROCALCITONIN SERPL-MCNC: 0.08 NG/ML
PROCALCITONIN SERPL-MCNC: 0.09 NG/ML
PROCALCITONIN SERPL-MCNC: 0.16 NG/ML
PROT SERPL-MCNC: 5.7 G/DL (ref 6.4–8.9)
PROTHROMBIN TIME: 23.7 SECONDS (ref 11.6–14.5)
RBC # BLD AUTO: 2.98 MILLION/UL (ref 4.3–5.9)
SODIUM SERPL-SCNC: 147 MMOL/L (ref 134–143)
TIBC SERPL-MCNC: 254 UG/DL (ref 250–450)
UNIT DISPENSE STATUS: NORMAL
UNIT DISPENSE STATUS: NORMAL
UNIT PRODUCT CODE: NORMAL
UNIT PRODUCT CODE: NORMAL
UNIT RH: NORMAL
UNIT RH: NORMAL
WBC # BLD AUTO: 18.1 THOUSAND/UL (ref 4.8–10.8)

## 2020-12-09 PROCEDURE — 85027 COMPLETE CBC AUTOMATED: CPT | Performed by: NURSE PRACTITIONER

## 2020-12-09 PROCEDURE — C9113 INJ PANTOPRAZOLE SODIUM, VIA: HCPCS | Performed by: NURSE PRACTITIONER

## 2020-12-09 PROCEDURE — 99223 1ST HOSP IP/OBS HIGH 75: CPT | Performed by: INTERNAL MEDICINE

## 2020-12-09 PROCEDURE — 85610 PROTHROMBIN TIME: CPT | Performed by: NURSE PRACTITIONER

## 2020-12-09 PROCEDURE — 80053 COMPREHEN METABOLIC PANEL: CPT | Performed by: NURSE PRACTITIONER

## 2020-12-09 PROCEDURE — 85018 HEMOGLOBIN: CPT | Performed by: NURSE PRACTITIONER

## 2020-12-09 PROCEDURE — 84145 PROCALCITONIN (PCT): CPT | Performed by: EMERGENCY MEDICINE

## 2020-12-09 PROCEDURE — 99233 SBSQ HOSP IP/OBS HIGH 50: CPT | Performed by: HOSPITALIST

## 2020-12-09 RX ORDER — LORAZEPAM 2 MG/ML
2 INJECTION INTRAMUSCULAR
Status: DISCONTINUED | OUTPATIENT
Start: 2020-12-09 | End: 2020-12-10

## 2020-12-09 RX ADMIN — CEFEPIME HYDROCHLORIDE 1000 MG: 1 INJECTION, SOLUTION INTRAVENOUS at 05:41

## 2020-12-09 RX ADMIN — SODIUM CHLORIDE 75 ML/HR: 0.9 INJECTION, SOLUTION INTRAVENOUS at 00:18

## 2020-12-09 RX ADMIN — MORPHINE SULFATE 2 MG: 2 INJECTION, SOLUTION INTRAMUSCULAR; INTRAVENOUS at 14:44

## 2020-12-09 RX ADMIN — NICOTINE 1 PATCH: 14 PATCH, EXTENDED RELEASE TRANSDERMAL at 08:30

## 2020-12-09 RX ADMIN — CARBIDOPA AND LEVODOPA 1 TABLET: 25; 100 TABLET ORAL at 08:29

## 2020-12-09 RX ADMIN — VERAPAMIL HYDROCHLORIDE 180 MG: 180 TABLET, FILM COATED, EXTENDED RELEASE ORAL at 08:29

## 2020-12-09 RX ADMIN — PANTOPRAZOLE SODIUM 40 MG: 40 INJECTION, POWDER, FOR SOLUTION INTRAVENOUS at 08:29

## 2020-12-09 RX ADMIN — CHLORHEXIDINE GLUCONATE 0.12% ORAL RINSE 15 ML: 1.2 LIQUID ORAL at 08:29

## 2020-12-09 NOTE — CONSULTS
Consultation - Nephrology   Meredith Taylor 80 y o  male MRN: [id number]  Unit/Bed#: ICU 05 Encounter: [id number]      A/P:  1  Acute kidney injury  Admitted with a creatinine of 2 1 mg/dl yesterday associated with iron deficiency anemia and hypoprothrombinemia  Treated with PRBC and IVF with improvement in creatinine of 1 8 mg/dl  Nonoliguric: 4633/1600 (+3633 since admission)  Would continue gentle hydration  I spoke with his daughter Rzo Jay in conjunction with Dr Alecia Cook regarding goals of care  His daughter states that "he does not want to be here anymore"  He lives alone but his family sleeps at his house at night and bring him food  His daughter says he can't see well, hear or walk well and has a poor quality of life  Comfort care will be provided and endoscopy will not be done  2  Chronic kidney disease  Most probably due to age related nephron loss  3  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   Hold coumadin            Thank you for allowing us to participate in the care of your patient  Please feel free to contact us regarding the care of this patient, or any other questions/concerns that may be applicable      Patient Active Problem List   Diagnosis    Acute cystitis with hematuria    Sepsis with acute hypoxic respiratory failure without septic shock ([address] )    Acute respiratory failure with hypoxia (HCC)    Acute blood loss anemia    Guaiac positive stools    Paroxysmal atrial fibrillation (HCC)    Benign prostatic hyperplasia with urinary retention    Essential hypertension    Tobacco use    Acute renal failure with acute tubular necrosis superimposed on stage 3b chronic kidney disease (HCC)    Multiple wounds of skin    Generalized weakness    Supratherapeutic INR       History of Present Illness   Physician Requesting Consult: Elsa Kong MD  Reason for Consult / Principal Problem: acute kidney injury  Hx and PE limited by:   HPI: Meredith Taylor is a 80y o  year old male who presents with an elevated creatinine of 2 10 mg/dl and hypoprothrombinemia and severe iron deficiency anemia  He was being treated for a UTI and was taking 2 course of antibiotics which may have altered his INR  He presented with generalized weakness and confusion  His daughter attributed it to the newly prescribed Sinemet  He was found to have a hemoglobin of 5 8g   He receiving 2 units of PRBC and his hemoglobin is 7 9 g today    He has a baseline creatinine of 1 4-1 7 mg/dl on records review over the past year as well as in [facility]  He was admitted, transfused and given gentle normal saline at 50 ml/hr and his creatinine did improve to  1 8 mg/dl this morning  Blood cultures and urine cultures are pending at the time of this dictation  He is Covid 19 and Influenza A and B negative    History obtained from chart review, the patient and the hospitalist    Review of Systems - Negative except as mentioned above in HPI, more specifics as mentioned below    Review of Systems - General ROS: positive for  - fatigue  Ophthalmic ROS: negative  ENT ROS: positive for - hearing change  Respiratory ROS: positive for - shortness of breath  Cardiovascular ROS: positive for - dyspnea on exertion  Gastrointestinal ROS: positive for - blood in stools  Genito-Urinary ROS: no dysuria, trouble voiding, or hematuria  Musculoskeletal ROS: positive for - muscular weakness  Neurological ROS: no TIA or stroke symptoms  Dermatological ROS: negative    Historical Information   Past Medical History:   Diagnosis Date    A-fib ([facility] )     BPH (benign prostatic hyperplasia)     Cholelithiasis     Hypertension     Prostate cancer ([facility] )     Renal disorder      Past Surgical History:   Procedure Laterality Date    TONSILLECTOMY       Social History   Social History     Substance and Sexual Activity   Alcohol Use Not Currently     Social History     Substance and Sexual Activity   Drug Use Never     Social History     Tobacco Use Smoking Status Current Some Day Smoker    Packs/day: 0 50    Types: Cigarettes   Smokeless Tobacco Never Used     History reviewed  No pertinent family history      Meds/Allergies   all current active meds have been reviewed, current meds:   Current Facility-Administered Medications   Medication Dose Route Frequency    acetaminophen (TYLENOL) tablet 650 mg  650 mg Oral Q6H PRN    carbidopa-levodopa (SINEMET)  mg per tablet 1 tablet  1 tablet Oral Q12H    cefepime (MAXIPIME) IVPB (premix in dextrose) 1,000 mg 50 mL  1,000 mg Intravenous Q12H    chlorhexidine (PERIDEX) 0 12 % oral rinse 15 mL  15 mL Swish & Spit Q12H [address]    nicotine (NICODERM CQ) 14 mg/24hr TD 24 hr patch 1 patch  1 patch Transdermal Daily    ondansetron (ZOFRAN) injection 4 mg  4 mg Intravenous Q6H PRN    oxybutynin (DITROPAN-XL) 24 hr tablet 15 mg  15 mg Oral HS    pantoprazole (PROTONIX) injection 40 mg  40 mg Intravenous Q12H [address]    sodium chloride 0 9 % infusion  75 mL/hr Intravenous Continuous    tamsulosin (FLOMAX) capsule 0 4 mg  0 4 mg Oral Daily With Dinner    ursodiol (ACTIGALL) capsule 300 mg  300 mg Oral TID    verapamil (CALAN-SR) CR tablet 180 mg  180 mg Oral Daily    and PTA meds:    Medications Prior to Admission   Medication    carbidopa-levodopa (SINEMET)  mg per tablet    furosemide (LASIX) 40 mg tablet    oxybutynin (DITROPAN XL) 15 MG 24 hr tablet    OXYBUTYNIN TD    potassium chloride (K-DUR,KLOR-CON) 10 mEq tablet    potassium chloride (MICRO-K) 10 MEQ CR capsule    tamsulosin (FLOMAX) 0 4 mg    ursodiol (ACTIGALL) 250 mg tablet    verapamil (CALAN-SR) 180 mg CR tablet    warfarin (Coumadin) 5 mg tablet         Allergies   Allergen Reactions    Quinidine Other (See Comments)     nausea/vomiting, rash      Dorzolamide     Timolol        Objective     Intake/Output Summary (Last 24 hours) at 12/9/2020 0932  Last data filed at 12/9/2020 0900  Gross per 24 hour   Intake 5233 75 ml   Output 1600 ml   Net 3633 75 ml       Invasive Devices:   Urethral Catheter Latex 16 Fr  (Active)   Output (mL) 450 mL 12/09/20 0541       Physical Exam      I/O last 3 completed shifts: In: 4633 8 [P O :720; I V :403 8; Blood:1400; IV Piggyback:2110]  Out: 1600 [Urine:1600]    Vitals:    12/09/20 0800   BP: 111/54   Pulse: 71   Resp: 22   Temp:    SpO2: 96%       Gen: cachectic tachypneic, Alert/Awake and pale  HEENT: no sclerous icterus, mucus membranes mildly dry neck supple  CV: +S1/S2, irregular  Lungs: CTA bilaterally  Abd: +BS, soft NT but distended  Has a de la cruz  Ext: all four extremities are warm with chronic discoloration of the skin of the feet and ankles  Skin: crusty lesion on dorsum of left foot - non tender on palpation with excoriations around the ankles and toes  Neuro: CN II-XII intact    Current Weight: Weight - Scale: 68 5 kg (151 lb 0 2 oz)  First Weight: Weight - Scale: 69 1 kg (152 lb 5 4 oz)    Lab Results:  I have personally reviewed pertinent labs      CBC:   Lab Results   Component Value Date    WBC 18 10 (H) 12/09/2020    HGB 7 9 (L) 12/09/2020    HCT 24 4 (L) 12/09/2020    MCV 82 12/09/2020     12/09/2020    MCH 26 5 12/09/2020    MCHC 32 3 12/09/2020    RDW 17 1 (H) 12/09/2020    MPV 7 4 (L) 12/09/2020     CMP:   Lab Results   Component Value Date    K 4 3 12/09/2020     (H) 12/09/2020    CO2 24 12/09/2020    BUN 76 (H) 12/09/2020    CREATININE 1 80 (H) 12/09/2020    CALCIUM 7 6 (L) 12/09/2020    AST 13 12/09/2020    ALT <3 (L) 12/09/2020    ALKPHOS 63 12/09/2020    EGFR 32 12/09/2020     Phosphorus: No results found for: PHOS  Magnesium: No results found for: MG  Urinalysis:   Lab Results   Component Value Date    COLORU Yellow 12/08/2020    CLARITYU Slightly Cloudy (A) 12/08/2020    SPECGRAV 1 015 12/08/2020    PHUR 6 0 12/08/2020    LEUKOCYTESUR 2+ (A) 12/08/2020    NITRITE Positive (A) 12/08/2020    GLUCOSEU Negative 12/08/2020    KETONESU Negative 12/08/2020    BILIRUBINUR Negative 12/08/2020    BLOODU 3+ (A) 12/08/2020     Ionized Calcium: No results found for: CAION  Coagulation:   Lab Results   Component Value Date    INR 2 15 (H) 12/09/2020     Troponin:   Lab Results   Component Value Date    TROPONINI <0 03 12/08/2020     ABG: No results found for: PHART, KWZ3TEE, PO2ART, NHO6ROB, I1XPPIQK, BEART, SOURCE    Results from last 7 days   Lab Units 12/09/20  0538 12/08/20  1535   POTASSIUM mmol/L 4 3 4 1   CHLORIDE mmol/L 114* 108*   CO2 mmol/L 24 25   BUN mg/dL 76* 88*   CREATININE mg/dL 1 80* 2 10*   CALCIUM mg/dL 7 6* 8 0*   ALK PHOS U/L 63 68   ALT U/L <3* 4*   AST U/L 13 14       Radiology review:  Procedure: Xr Chest Portable    Result Date: 12/8/2020  Narrative: CHEST INDICATION:   ro infiltrate  Patient has suspected COVID-19  COMPARISON:  10/8/2020 EXAM PERFORMED/VIEWS:  XR CHEST PORTABLE FINDINGS: Cardiomediastinal silhouette appears unremarkable  The lungs are clear  No pneumothorax or pleural effusion  Osseous structures appear within normal limits for patient age  Impression: No acute cardiopulmonary disease  Workstation performed: [id number]         EKG, Pathology, and Other Studies: I have personally reviewed the CXR and all labs, notes and notes in [address] / Coordination of Care  Total ADDITIONAL floor / unit time spent today 40 minutes  Greater than 50% of total time was spent with the patient and / or family counseling and / or coordination of care  A description of the counseling / coordination of care: follows    Leidy Sotelo MD      This consultation note was produced in part using a dictation device which may document imprecise wording from author's original intent

## 2020-12-09 NOTE — ASSESSMENT & PLAN NOTE
· Patient with significant lower extremity wounds  · Patient appears to have Xeroform dried to his skin  · Consult to wound care

## 2020-12-09 NOTE — ASSESSMENT & PLAN NOTE
· No active rectal bleeding  · Most likely secondary to supratherapeutic INR  · Monitor for GI bleed  · Hold Coumadin  · Patient did receive vitamin K 10 mg IV  · Give patient Protonix IV Q 12 hours  · GI consult

## 2020-12-09 NOTE — H&P
H&P- Moriah Race 3/22/1930, 80 y o  male MRN: [id number]    Unit/Bed#: ICU 05 Encounter: [id number]    Primary Care Provider: José Miguel Carreon DO   Date and time admitted to hospital: 12/8/2020  3:17 PM        * Generalized weakness  Assessment & Plan  · Patient had been able to walk with a cane but recently unable to  · Patient recently started on Sinemet by his PCP  · PT OT evaluation    Guaiac positive stools  Assessment & Plan  · No active rectal bleeding  · Most likely secondary to supratherapeutic INR  · Monitor for GI bleed  · Hold Coumadin  · Patient did receive vitamin K 10 mg IV  · Give patient Protonix IV Q 12 hours  · GI consult    Anemia due to stage 3b chronic kidney disease  Assessment & Plan  Lab Results   Component Value Date    EGFR 27 12/08/2020    EGFR 28 10/08/2020    EGFR 29 09/16/2020    CREATININE 2 10 (H) 12/08/2020    CREATININE 2 01 (H) 10/08/2020    CREATININE 2 00 (H) 09/16/2020   · Patient baseline 1 4-1 6  · Avoid nephrotoxic medications  · Patient has received 2 L normal saline solution  · Continue with IV fluids  · Monitor labs  · If no improvement may need to consult to Nephrology  · Hemoglobin baseline is 9-10  · Patient is receiving 2 units of packed red blood cells  · Monitor H&H Q 6 hours  · Iron panel    Supratherapeutic INR  Assessment & Plan  · INR to be between 2 and 3  · Patient had recently been on 2 separate doses of oral antibiotics for UTI  · Most recent antibiotic was amoxicillin which was stopped on 12/06/2020  · Patient's INR on admission is 13 24  · Patient did receive vitamin K 10 mg IV  · Monitor labs    Acute respiratory failure with hypoxia ([address] )  Assessment & Plan  · Patient found to be hypoxic by EMS with 80% on room air  · Patient placed on 5 L nasal cannula with improvement to 99%  · Patient currently on 3 L with sats of 97%  · Respiratory protocol    Sepsis with acute hypoxic respiratory failure without septic shock ([address] )  Assessment & Plan  · As evidence by tachypnea, hypoxia, leukocytosis, super therapeutic INR, lactic acidosis, anemia, elevated creatinine, UTI, respiratory failure  · Patient did receive 2 L normal saline, and IV cefepime  · Continue to monitor labs  · Repeat lactic acid of 1 1  · Procalcitonin is pending  · Blood cultures are pending  · Urine culture pending    Acute cystitis with hematuria  Assessment & Plan  · Patient has been having issues with urinary tract infection since the beginning of October this year  · Patient has had 2 outpatient rounds of oral antibiotics  · Most recent antibiotic was finished on 12/06/2020  · Urinalysis does show cloudy, 2+ leukocytes, positive nitrates, 3+ blood, moderate bacteria  · Urine culture is pending  · Previous urine culture showed beta-hemolytic strep group a  · Continue cefepime    Acute renal failure with acute tubular necrosis superimposed on stage 3b chronic kidney disease [facility])  Assessment & Plan  Lab Results   Component Value Date    EGFR 27 12/08/2020    EGFR 28 10/08/2020    EGFR 29 09/16/2020    CREATININE 2 10 (H) 12/08/2020    CREATININE 2 01 (H) 10/08/2020    CREATININE 2 00 (H) 09/16/2020   · Patient baseline 1 4-1 6  · Avoid nephrotoxic medications  · Patient has received 2 L normal saline solution  · Continue with IV fluids   · Monitor labs  · Hold Lasix at this time    Multiple wounds of skin  Assessment & Plan  · Patient with significant lower extremity wounds  · Patient appears to have Xeroform dried to his skin  · Consult to wound care    Paroxysmal atrial fibrillation (HCC)  Assessment & Plan  · Stable and normal sinus rhythm  · Will continue verapamil  · Coumadin is on hold at this time    Benign prostatic hyperplasia with urinary retention  Assessment & Plan  · Continued to troponin and Flomax    Essential hypertension  Assessment & Plan  · Continue verapamil    Tobacco use  Assessment & Plan  · Education on quitting  · Nicotine patch    VTE Prophylaxis: Warfarin (Coumadin) - currently on hold  Code Status:  A DNR DNI  Discussion with family:  Discussed with the patient's daughter Jeanine Hale on the telephone in the intensive care unit    Anticipated Length of Stay:  Patient will be admitted on an Inpatient basis with an anticipated length of stay of  > 2 midnights  Justification for Hospital Stay:  IV fluids IV antibiotics    Total Time for Visit, including Counseling / Coordination of Care: 45 minutes  Greater than 50% of this total time spent on direct patient counseling and coordination of care  Chief Complaint:   Generalized weakness    History of Present Illness:    Nereyda Wen is a 80 y o  male who presents with generalized weakness  Patient has been having issues with urinary tract infection since October 2020  Patient has had 2 separate doses of oral antibiotics and actually finished the last pill on 12/06/2020  Patient is positive again for another urinary tract infection  The patient's daughter also had thought that the patient seemed more weak and slightly confused secondary to recently been starting on Sinemet  He was able to walk with this single-point cane and now he is unable to ambulate at all  Currently patient is confused, which after speaking with the patient's daughter he does have some baseline dementia but is usually alert and oriented x3  When the patient's daughter arrived at his house today, she found him very weak and unable to sit up  She called 911, and they found him to have an oxygen saturation of 88% on room air  They immediately put him on 5 L nasal cannula and his oxygen saturation did improve to 97%  In the emergency department his oxygen was titrated down to 4 L, continuing his saturation of 99%  He was found to have a positive urinary tract infection, and he was worked up for sepsis  He was also found to have an elevated INR of 13 24 for which he did receive vitamin K 10 mg IV    Patient was started on cefepime, and given 2 L of normal saline solution  Patient's hemoglobin was also found to be 5 8 for which 2 units of packed red blood cells were started for this patient  Review of Systems:    Review of Systems   Reason unable to perform ROS: Patient does have some baseline confusion but is able to answer certain questions appropriately  Genitourinary:        Patient does complain of some suprapubic discomfort   Musculoskeletal:        Patient states he has been having issues with walking  Past Medical and Surgical History:     Past Medical History:   Diagnosis Date    A-fib ([place] [address] )     BPH (benign prostatic hyperplasia)     Cholelithiasis     Hypertension     Prostate cancer ([place] [address] )     Renal disorder        Past Surgical History:   Procedure Laterality Date    TONSILLECTOMY         Meds/Allergies:    Prior to Admission medications    Medication Sig Start Date End Date Taking?  Authorizing Provider   albuterol (PROAIR HFA) 90 mcg/act inhaler ProAir HFA 90 mcg/actuation aerosol inhaler    Historical Provider, MD   carbidopa-levodopa (SINEMET)  mg per tablet Take by mouth every 12 (twelve) hours 11/30/20   Historical Provider, MD   furosemide (LASIX) 40 mg tablet furosemide 40 mg tablet   take 1 tablet by mouth once daily    Historical Provider, MD   oxybutynin (DITROPAN XL) 15 MG 24 hr tablet oxybutynin chloride ER 15 mg tablet,extended release 24 hr    Historical Provider, MD   OXYBUTYNIN TD Place on the skin    Historical Provider, MD   potassium chloride (K-DUR,KLOR-CON) 10 mEq tablet Take by mouth Daily 9/14/20   Historical Provider, MD   potassium chloride (MICRO-K) 10 MEQ CR capsule potassium chloride ER 10 mEq capsule,extended release    Historical Provider, MD   tamsulosin (FLOMAX) 0 4 mg tamsulosin 0 4 mg capsule    Historical Provider, MD   ursodiol (ACTIGALL) 250 mg tablet Take 250 mg by mouth 3 (three) times a day    Historical Provider, MD   verapamil (CALAN-SR) 180 mg CR tablet verapamil ER (SR) 180 mg tablet,extended release    Historical Provider, MD   warfarin (Coumadin) 5 mg tablet TAKE 1 TABLET DAILY BUT MAYTAKE MORE AT TIMES AS      DIRECTED BY YOUR PHYSICIAN 7/27/20   Historical Provider, MD     Medications were reviewed with patient's daughter  Allergies: No Known Allergies    Social History:     Marital Status:    Occupation:  Retired  Patient Pre-hospital Living Situation:  Lives at home alone, patient's daughter lives across the street and is at his home most of the time  Patient Pre-hospital Level of Mobility:  Previously with a single-point cane  Patient Pre-hospital Diet Restrictions:  Regular  Substance Use History:   Social History     Substance and Sexual Activity   Alcohol Use Not Currently     Social History     Tobacco Use   Smoking Status Current Some Day Smoker    Packs/day: 0 50    Types: Cigarettes   Smokeless Tobacco Never Used     Social History     Substance and Sexual Activity   Drug Use Never       Family History:  I have reviewed the patient's family history    Physical Exam:     Vitals:   Blood Pressure: 104/57 (12/08/20 1850)  Pulse: 75 (12/08/20 1850)  Temperature: (!) 96 2 °F (35 7 °C) (12/08/20 1752)  Temp Source: Tympanic (12/08/20 1850)  Respirations: (!) 24 (12/08/20 1850)  Height: 5' 8" (172 7 cm) (12/08/20 1850)  Weight - Scale: 69 1 kg (152 lb 5 4 oz) (12/08/20 1850)  SpO2: 98 % (12/08/20 1850)    Physical Exam  Constitutional:       General: He is awake  Appearance: He is cachectic  He is ill-appearing  HENT:      Head: Normocephalic and atraumatic  Right Ear: Decreased hearing noted  Left Ear: Decreased hearing noted  Nose: Nose normal       Mouth/Throat:      Mouth: Mucous membranes are moist       Comments: Patient has numerous missing teeth  Eyes:      Extraocular Movements: Extraocular movements intact  Neck:      Musculoskeletal: Normal range of motion  Cardiovascular:      Rate and Rhythm: Normal rate and regular rhythm  Heart sounds: Normal heart sounds  Pulmonary:      Effort: Tachypnea present  Breath sounds: Examination of the right-lower field reveals decreased breath sounds  Examination of the left-lower field reveals decreased breath sounds  Decreased breath sounds present  Abdominal:      General: Bowel sounds are normal    Genitourinary:     Comments: Wells catheter placed in the emergency department, patient with geoff colored urine with mucus threads  Musculoskeletal:      Comments: Severe weakness   Skin:     Comments: Bilateral lower extremities with multiple lesions and open wounds  Neurological:      Mental Status: He is alert  Comments: Pleasantly confused   Psychiatric:         Attention and Perception: Attention normal          Mood and Affect: Mood normal          Speech: Speech normal          Behavior: Behavior normal  Behavior is cooperative  Additional Data:     Lab Results: I have personally reviewed pertinent reports  Results from last 7 days   Lab Units 12/08/20  1535   WBC Thousand/uL 18 80*   HEMOGLOBIN g/dL 5 8*   HEMATOCRIT % 18 5*   PLATELETS Thousands/uL 336   BANDS PCT % 3   LYMPHO PCT % 4*   MONO PCT % 2*     Results from last 7 days   Lab Units 12/08/20  1535   SODIUM mmol/L 143   POTASSIUM mmol/L 4 1   CHLORIDE mmol/L 108*   CO2 mmol/L 25   BUN mg/dL 88*   CREATININE mg/dL 2 10*   ANION GAP mmol/L 10   CALCIUM mg/dL 8 0*   ALBUMIN g/dL 2 6*   TOTAL BILIRUBIN mg/dL 0 40   ALK PHOS U/L 68   ALT U/L 4*   AST U/L 14   GLUCOSE RANDOM mg/dL 115*     Results from last 7 days   Lab Units 12/08/20  1629   INR  13 24*             Results from last 7 days   Lab Units 12/08/20  1629 12/08/20  1535   LACTIC ACID mmol/L 1 1 2 5*       Imaging: I have personally reviewed pertinent reports  XR chest portable   Final Result by Ngozi Childs MD (12/08 1629)      No acute cardiopulmonary disease                    Workstation performed: [id number]               EKG, Pathology, and Other Studies Reviewed on Admission:   · EKG:  Not performed in the ED    Epic / Care Everywhere Records Reviewed: Yes    ** Please Note: This note has been constructed using a voice recognition system   **

## 2020-12-09 NOTE — ASSESSMENT & PLAN NOTE
· Resolved  · Unspecified exact etiology - possibly secondary to the patient's arrival anemic state  · Current requirements are 1 L of supplemental oxygen via nasal cannula with a resultant O2 sat in the upper 90s  · Will attempt to wean the patient off of supplemental oxygen altogether  · Patient had required as much as 5 L of supplemental oxygen  · Continue respiratory protocol

## 2020-12-09 NOTE — PLAN OF CARE
Problem: HEMATOLOGIC - ADULT  Goal: Maintains hematologic stability  Description: INTERVENTIONS  - Assess for signs and symptoms of bleeding or hemorrhage  - Monitor labs  - Administer supportive blood products/factors as ordered and appropriate  Outcome: Progressing     Problem: PAIN - ADULT  Goal: Verbalizes/displays adequate comfort level or baseline comfort level  Description: Interventions:  - Encourage patient to monitor pain and request assistance  - Assess pain using appropriate pain scale  - Administer analgesics based on type and severity of pain and evaluate response  - Implement non-pharmacological measures as appropriate and evaluate response  - Consider cultural and social influences on pain and pain management  - Notify physician/advanced practitioner if interventions unsuccessful or patient reports new pain  Outcome: Progressing

## 2020-12-09 NOTE — UTILIZATION REVIEW
Initial Clinical Review    Admission: Date/Time/Statement:   Admission Orders (From admission, onward)     Ordered        12/08/20 1745  Inpatient Admission  Once                Orders Placed This Encounter   Procedures    Inpatient Admission     Standing Status:   Standing     Number of Occurrences:   1     Order Specific Question:   Admitting Physician     Answer:   Jasen Friday [[id number]]     Order Specific Question:   Level of Care     Answer:   Critical Care [15]     Order Specific Question:   Estimated length of stay     Answer:   More than 2 Midnights     Order Specific Question:   Certification     Answer:   I certify that inpatient services are medically necessary for this patient for a duration of greater than two midnights  See H&P and MD Progress Notes for additional information about the patient's course of treatment  ED Arrival Information     Expected Arrival Acuity Means of Arrival Escorted By Service Admission Type    - 12/8/2020 15:17 Emergent Ambulance [facility]    Arrival Complaint    -        Chief Complaint   Patient presents with    Weakness - Generalized     Assessment/Plan:   80 yom to er via ems from home c/o generalized weakness with mild confusion; normally independent with ADL's, now unable to ambulate  Patient has been having issues with urinary tract infection since October 2020  Patient has had 2 separate doses of oral antibiotics and actually finished the last pill on 12/06/2020  Found by EMS to be hypoxic @88%  Hx afib on coumadin, bph, htn, ckd (baseline cr 1 4-1 6)  Presents hypoxic with abd tenderness, guaiac+stools  Admission work-up showing Hgb 5 8, leukocytosis, rhonda, supratherapeutic INR>13, elevated lactic acid, +u/a  Admitted to inpatient status for sepsis with acute hypoxic respiratory failure 2nd UTI  Started on ivabt, ivf, iv ppi, renal & GI consulted, transfusion ordered       12/9:  IVABT continued for UTI, blood & urine cultures pending  IVF maintained for rhonda, creatinine trending downward  O2 weaned from 5ltr to currently @1 ltr via nc, lungs with coarse rhonchi noted, dry cough  Hgb s/p transfusion 2uprbc's now @ 7 9  Follow up labs scheduled       ED Triage Vitals [12/08/20 1554]   Temperature Pulse Respirations Blood Pressure SpO2   98 6 °F (37 °C) 88 22 112/86 (!) 88 %      Temp Source Heart Rate Source Patient Position - Orthostatic VS BP Location FiO2 (%)   Tympanic Monitor Sitting Left arm --      Pain Score       --          Wt Readings from Last 1 Encounters:   12/09/20 68 5 kg (151 lb 0 2 oz)     Additional Vital Signs:   12/08/20 1930  96 °F (35 6 °C)Abnormal   71  17  116/59    96 %           12/08/20 1915    71  21  107/56    97 %           12/08/20 1900    76  21  113/57    98 %           12/08/20 1850    75  24Abnormal   104/57  75  98 %        Lying   12/08/20 1815    72  20  104/57  78  99 %      Nasal cannula  Sitting   12/08/20 1752  96 2 °F (35 7 °C)Abnormal   76  18  102/60               12/08/20 1745    76  20  102/60  76  100 %      Nasal cannula  Sitting     Pertinent Labs/Diagnostic Test Results:   Results from last 7 days   Lab Units 12/08/20  1535   SARS-COV-2  Negative     Results from last 7 days   Lab Units 12/09/20  0538 12/09/20  0009 12/08/20  1535   WBC Thousand/uL 18 10*  --  18 80*   HEMOGLOBIN g/dL 7 9* 7 6* 5 8*   HEMATOCRIT % 24 4*  --  18 5*   PLATELETS Thousands/uL 271  --  336   TOTAL NEUT ABS Thousand/uL  --   --  17 67*   BANDS PCT %  --   --  3     Results from last 7 days   Lab Units 12/09/20  0538 12/08/20  1535   SODIUM mmol/L 147* 143   POTASSIUM mmol/L 4 3 4 1   CHLORIDE mmol/L 114* 108*   CO2 mmol/L 24 25   ANION GAP mmol/L 9 10   BUN mg/dL 76* 88*   CREATININE mg/dL 1 80* 2 10*   EGFR ml/min/1 73sq m 32 27   CALCIUM mg/dL 7 6* 8 0*     Results from last 7 days   Lab Units 12/09/20  0538 12/08/20  1535   AST U/L 13 14   ALT U/L <3* 4*   ALK PHOS U/L 63 68   TOTAL PROTEIN g/dL 5 7* 6 3*   ALBUMIN g/dL 2 4* 2 6*   TOTAL BILIRUBIN mg/dL 0 70 0 40     Results from last 7 days   Lab Units 12/09/20  0538 12/08/20  1535   GLUCOSE RANDOM mg/dL 106* 115*     Results from last 7 days   Lab Units 12/08/20  1535   PH PHYLLIS  7 340   PCO2 PHYLLIS mm Hg 46 4   PO2 PHYLLIS mm Hg 50 0*   HCO3 PHYLLIS mmol/L 24 5   BASE EXC PHYLLIS mmol/L -0 8   O2 CONTENT PHYLLIS ml/dL 5 8   O2 HGB, VENOUS % 73 1     Results from last 7 days   Lab Units 12/08/20  1535   TROPONIN I ng/mL <0 03     Results from last 7 days   Lab Units 12/09/20  0538 12/08/20  1629   PROTIME seconds 23 7* 97 0*   INR  2 15* 13 24*   PTT seconds  --  161*     Results from last 7 days   Lab Units 12/08/20  1535   PROCALCITONIN ng/ml 0 08     Results from last 7 days   Lab Units 12/08/20  1629 12/08/20  1535   LACTIC ACID mmol/L 1 1 2 5*     Results from last 7 days   Lab Units 12/08/20  1536   CLARITY UA  Slightly Cloudy*   COLOR UA  Yellow   SPEC GRAV UA  1 015   PH UA  6 0   GLUCOSE UA mg/dl Negative   KETONES UA mg/dl Negative   BLOOD UA  3+*   PROTEIN UA mg/dl Negative   NITRITE UA  Positive*   BILIRUBIN UA  Negative   UROBILINOGEN UA E U /dl 0 2   LEUKOCYTES UA  2+*   WBC UA /hpf 30-50*   RBC UA /hpf 10-20*   BACTERIA UA /hpf Moderate*   EPITHELIAL CELLS WET PREP /hpf Occasional     Results from last 7 days   Lab Units 12/08/20  1535   INFLUENZA A PCR  Negative   INFLUENZA B PCR  Negative   RSV PCR  Negative     Results from last 7 days   Lab Units 12/08/20  1535 12/08/20  1525   BLOOD CULTURE  Received in Microbiology Lab  Culture in Progress  Received in Microbiology Lab  Culture in Progress  Results from last 7 days   Lab Units 12/08/20  1535   TOTAL COUNTED  100     12/8  Cxr= No acute cardiopulmonary disease    Ekg= Baseline artifact likely  Atrial fibrillation    ED Treatment:   Medication Administration from 12/08/2020 1517 to 12/08/2020 1839       Date/Time Order Dose Route Action     12/08/2020 1547 sodium chloride 0 9 % bolus 1,000 mL 1,000 mL Intravenous New Bag     12/08/2020 1626 sodium chloride 0 9 % bolus 1,000 mL 1,000 mL Intravenous New Bag     12/08/2020 1547 cefepime (MAXIPIME) IVPB (premix in dextrose) 2,000 mg 50 mL 2,000 mg Intravenous New Bag     12/08/2020 1750 phytonadione (AQUA-MEPHYTON) 10 mg/mL 10 mg in sodium chloride 0 9 % 50 mL IVPB 10 mg Intravenous New Bag        Past Medical History:   Diagnosis Date    A-fib ([place] [address] )     BPH (benign prostatic hyperplasia)     Cholelithiasis     Hypertension     Prostate cancer ([place] [address] )     Renal disorder      Present on Admission:   Acute cystitis with hematuria   Sepsis with acute hypoxic respiratory failure without septic shock (HCC)   Acute respiratory failure with hypoxia (HCC)   Acute blood loss anemia   Guaiac positive stools   Paroxysmal atrial fibrillation (HCC)   Benign prostatic hyperplasia with urinary retention   Essential hypertension   Tobacco use   Acute renal failure with acute tubular necrosis superimposed on stage 3b chronic kidney disease (HCC)   Multiple wounds of skin   Generalized weakness   Supratherapeutic INR    Admitting Diagnosis: UTI (urinary tract infection) [N39 0]  Anemia [D64 9]  Sepsis ([place] [address] ) [A41 9]  Age/Sex: 80 y o  male  Admission Orders:  Consult GI  Scd/foot pumps  Clear liquids  Incentive spirometry  Pt/ot eval & tx  Consult renal  Transfuse 2uprbc's    Scheduled Medications:  carbidopa-levodopa, 1 tablet, Oral, Q12H  cefepime, 1,000 mg, Intravenous, Q12H  chlorhexidine, 15 mL, Swish & Spit, Q12H [address]  nicotine, 1 patch, Transdermal, Daily  oxybutynin, 15 mg, Oral, HS  pantoprazole, 40 mg, Intravenous, Q12H AMIRA  tamsulosin, 0 4 mg, Oral, Daily With Dinner  ursodiol, 300 mg, Oral, TID  verapamil, 180 mg, Oral, Daily    Continuous IV Infusions:  sodium chloride, 75 mL/hr, Intravenous, Continuous    PRN Meds:  acetaminophen, 650 mg, Oral, Q6H PRN  ondansetron, 4 mg, Intravenous, Q6H PRN    Network Utilization Review Department  [email or web address]  ATTENTION: Please call with any questions or concerns to [phone] and carefully listen to the prompts so that you are directed to the right person  All voicemails are confidential   Mary Jane Gooden all requests for admission clinical reviews, approved or denied determinations and any other requests to dedicated fax number below belonging to the campus where the patient is receiving treatment   List of dedicated fax numbers for the Facilities:  [address] DENIALS (Administrative/Medical Necessity) [phone]   [address] (Maternity/NICU/Pediatrics) [phone]   Fernie العراقي [phone]   Flavio Shah [phone]   Amy Hidalgo [phone]   Yaritza Epstein [phone]   [address] [place] [address] [phone]   [facility]  [phone]   [address] [facility]  [address] [facility] [address] [facility] [phone]

## 2020-12-09 NOTE — MALNUTRITION/BMI
This medical record reflects one or more clinical indicators suggestive of malnutrition and/or morbid obesity  Malnutrition Findings:   Adult Malnutrition type: Chronic illness  Adult Degree of Malnutrition: Malnutrition of moderate degree(in setting of renal d/o as evidenced by inadequate energy intake moderate muscle wasting clavicle, scapula, deltoid, temporalis area, moderate fat loss triceps, buccal pad treated with clear liquids/potential dt advancement)  Malnutrition Characteristics: Muscle loss, Fat loss    BMI Findings:  Adult BMI Classifications: (22 96)     Body mass index is 22 96 kg/m²  See Nutrition note dated 12/9/2020 for additional details  Completed nutrition assessment is viewable in the nutrition documentation

## 2020-12-09 NOTE — HOSPICE NOTE
Received referral, called and spoke with patients daughter/POA Zayra Leyva  Explained Hospice and Medicare Guidelines and Zayra Leyva was agreeable  Patient approved for Routine LOC by Dr Fatou Kendrick  Equipment ordered  Consents to be signed at time of open by daughter  Requesting transport be set up for 11 am  Also requesting that scripts be provided for Roxanol and Ativan at time of discharge  Duyen, CM updated as well

## 2020-12-09 NOTE — ASSESSMENT & PLAN NOTE
· Patient has been having issues with urinary tract infection since the beginning of October this year  · Patient has had 2 outpatient rounds of oral antibiotics  · Most recent antibiotic was finished on 12/06/2020  · Urinalysis does show cloudy, 2+ leukocytes, positive nitrates, 3+ blood, moderate bacteria  · Urine culture is pending  · Previous urine culture showed beta-hemolytic strep group a  · Continue cefepime

## 2020-12-09 NOTE — CASE MANAGEMENT
Evaluated the pt at the bedside  Pt was admitted to the hospital for sepsis  Explained the role of CM and the options of discharge planning with the pt  Pt states he lives alone (his dtr lives across the street) in a 3 story home with a ramp outside  Pt daughter comes every day and provides bathing, dressing and all IADL's  Pt does not have any oxygen at home  Pt has a chairglide, gerardo, walker,  WC, BSC and walker  Pt did not know name of pharmacy  KANDY has ordered comfort measures and place a hospice order  Spoke to pt daughter Amie Meza about hospices and choices  Pt chose [address] and states she would like to take him home on hospice services  Sent a referral to [address]  Pt will need BLS transport home  Patient/caregiver received discharge checklist   Content reviewed  Patient/caregiver encouraged to participate in discharge plan of care prior to discharge home  CM reviewed d/c planning process including the following: identifying help at home, patient preference for d/c planning needs, availability of treatment team to discuss questions or concerns patient and/or family may have regarding understanding medications and recognizing signs and symptoms once discharged  CM also encouraged patient to follow up with all recommended appointments after discharge  Patient advised of importance for patient and family to participate in managing patients medical well being

## 2020-12-09 NOTE — SEPSIS NOTE
Sepsis Note   Paty Deandre 80 y o  male MRN: [id number]  Unit/Bed#: ICU 05 Encounter: [id number]      qSOFA     [address] Name 12/08/20 1850 12/08/20 1815 12/08/20 1752 12/08/20 1745 12/08/20 1645    Altered mental status GCS < 15              Respiratory Rate > / =22  1  0  0  0  1    Systolic BP < / =914  0  0  0  0  1    Q Sofa Score  1  0  0  0  2    Row Name 12/08/20 1630 12/08/20 1554             Altered mental status GCS < 15           Respiratory Rate > / =22  0  1       Systolic BP < / =130  0  0       Q Sofa Score  0  1           Initial Sepsis Screening     Row Name 12/08/20 1921                Is the patient's history suggestive of a new or worsening infection? (!) Yes (Proceed)  -KM        Suspected source of infection  urinary tract infection  -KM        Are two or more of the following signs & symptoms of infection both present and new to the patient? (!) Yes (Proceed)  -KM        Indicate SIRS criteria  Tachypnea > 20 resp per min;Leukocytosis (WBC > [address]69 IJL)  -KM        If the answer is yes to both questions, suspicion of sepsis is present          If severe sepsis is present AND tissue hypoperfusion perists in the hour after fluid resuscitation or lactate > 4, the patient meets criteria for SEPTIC SHOCK          Are any of the following organ dysfunction criteria present within 6 hours of suspected infection and SIRS criteria that are NOT considered to be chronic conditions? (!) Yes  -KM        Organ dysfunction  INR > 1 5 or aPTT > 60 secs;Creatinine > 2 0 mg/dL; Lactate > 2 0 mmol/L  -KM        Date of presentation of severe sepsis  12/08/20  -KM        Time of presentation of severe sepsis  1921  -KM        Tissue hypoperfusion persists in the hour after crystalloid fluid administration, evidenced, by either:          Was hypotension present within one hour of the conclusion of crystalloid fluid administration?   No  -KM        Date of presentation of septic shock          Time of presentation of septic shock            User Key  (r) = Recorded By, (t) = Taken By, (c) = Cosigned By    [address] Name Provider Type    [address], [address] [facility] Nurse Practitioner

## 2020-12-09 NOTE — ASSESSMENT & PLAN NOTE
Lab Results   Component Value Date    EGFR 27 12/08/2020    EGFR 28 10/08/2020    EGFR 29 09/16/2020    CREATININE 2 10 (H) 12/08/2020    CREATININE 2 01 (H) 10/08/2020    CREATININE 2 00 (H) 09/16/2020   · Patient baseline 1 4-1 6  · Avoid nephrotoxic medications  · Patient has received 2 L normal saline solution  · Continue with IV fluids   · Monitor labs  · Hold Lasix at this time

## 2020-12-09 NOTE — ASSESSMENT & PLAN NOTE
· Comfort measures only status  · No further testing, treatment, and/or escalation in care as per family decision

## 2020-12-09 NOTE — ASSESSMENT & PLAN NOTE
· Patient had been able to walk with a cane but recently unable to  · Patient recently started on Sinemet by his PCP  · PT OT evaluation

## 2020-12-09 NOTE — ASSESSMENT & PLAN NOTE
· Patient found to be hypoxic by EMS with 80% on room air  · Patient placed on 5 L nasal cannula with improvement to 99%  · Patient currently on 3 L with sats of 97%  · Respiratory protocol

## 2020-12-09 NOTE — NUTRITION
12/09/20 0922   Assessment   Timepoint Initial  (consult diet recommendations trigger for wounds, wt  loss, poor PO and muscle wasting/fat loss)   Labs & Meds   Labs/Meds Review Lab values reviewed; Meds reviewed; Albumin;BUN;Creatinine; Hematocrit; Hemoglobin; Sodium  (12/9 Na 147 BUN 76 CREAT 1 80 albumin 2 4 Hgb 7 9 HCT 24 4  sinemet maxipime ursodiol)   Feeding Route   PO With assistance   Swallow Other (Comment)  (poor dentition noted some coughing with fluid intake)   Adequacy of Intake   Nutrition Modality PO  (clear liquid diet)   Estimated Fluid Intake %  (NS 75 mL/hour 1800 mL daily)   Estimated calorie intake compared to estimated need inadequate intake on clear liquids   Nutrition Prognosis   Nutrition Concerns RN skin assessment reviewed;Skin breakdown; No edema documented; Respiratory distress   Comorbid Concerns Other (Comment)  (afib HTN tobacco abuse renal d/o)   Nutrition Precautions & Barriers to Adequate Nutrition Other (Comment); Wound healing  (advanced age)   Nutrition Considerations Nutrition Educ not indicated at this time   PES Statement   Problem Clinical   Weight (3) Unintended weight loss NC-3 2   Related to Decreased appetite   As evidenced by: Loss of muscle mass;Poor wound healing  (wt  loss 11#/7% in 11 months)   Patient Nutrition Goals   Goal Improve to healthful diet   Goal Status initiated   Timeframe to complete goal by next f/u   Recommendations/Interventions   Summary Consult for diet recommendations  Pt receiving clear liquid diet, GI consult pending r/t guaiac + stools  Pt has multiple wounds on both LE  Insignificant, undesirable wt  loss noted over past 11 months was 162# 1/6/20 11#/7% loss  Pt endorsed eating to satiety at home, but not consistently finishing food on his plate  Poor dentition noted  Would recommend protein supplements when diet able to be advanced, also consider ST eval due to cough noted with fluid intake this visit/poor dentition     Malnutrition/BMI Present Yes   Adult Malnutrition type Chronic illness   Adult Degree of Malnutrition Malnutrition of moderate degree  (in setting of renal d/o as evidenced by inadequate energy intake moderate muscle wasting clavicle, scapula, deltoid, temporalis area, moderate fat loss triceps, buccal pad treated with clear liquids/potential dt advancement)   Malnutrition Characteristics Muscle loss; Fat loss   Adult BMI Classifications   (22 96)   Interventions Diet: continued as ordered;Speech/swallow evaluation   Nutrition Recommendations Other (Specify)  (see above summary)   Nutrition Complexity Risk   Nutrition complexity level High risk   Follow up date 12/11/20  (dt advancement/supplements)

## 2020-12-09 NOTE — WOUND OSTOMY CARE
Consult Note - Wound   Zachariah [place] 80 y o  male MRN: [id number]  Unit/Bed#: ICU 05 Encounter: [id number]        History and Present Illness:  Wound care consulted for patient admitted with sepsis, acute hypoxic respiratory failure, guaiac positive stools and acute renal failure  Patient history significant for a-fib, BPH with urinary retention, HTN, tobacco use and stage 3b CKD  Patient presents with moderate malnutrition, BMI of 22 96  Per DR Jason Barajas, patient has been made comfort care at this time, family declined further treatments, waiting for hospice to see patient  Did not complete a full assessment, applied dressings to lower extremity wounds for comfort and provide dressings for drainage  Assessment Findings:   Patient in bed, he is sleeping, woke briefly and went back to sleep  Wells catheter in place for urinary management  1  POA-Bilateral lower extremities scattered open venous ulcer wound beds from knees to feet, moderate yellow drainage  2  POA-Bilateral feet on various toes, dry abrasions  3  POA-Left heel DTI, purple intact blister, periwound edema,  woody texture to skin of b/l lower extremities, weak pulses noted    Did not turn patient, per RN Eboni Jimenez, buttocks pink and blanchable    Skin and Wound Care Plan:   1  Skin nourishing cream daily  2  Elevate heels off of bed at all times with pillows to offload  3  Apply Hydraguard to buttocks and sacrum BID and PRN  4  Turn and reposition Q2 hours or when medically stable  5  Allevyn life heel foams to bilateral heels, alesha left with T, alesha right with P, date, and peel back daily for skin assessment, change every 3 days or with soilage or dislodgement  6   Cleanse bilateral lower extremities with soap and water, pat dry, apply Xeroform to areas of open wound beds, cover with ABD pads and wrap with mckenzie, change daily or with soilage or dislodgement    Wounds:  Wound 12/08/20 Pressure Injury Heel Left (Active)   Wound Description Light purple 12/09/20 1100   Pressure Injury Stage DTPI 12/09/20 1100   Yanna-wound Assessment Erythema;Fragile; Other (Comment) 12/09/20 1100   Wound Length (cm) 1 5 cm 12/09/20 1100   Wound Width (cm) 1 5 cm 12/09/20 1100   Wound Depth (cm) 0 cm 12/09/20 1100   Wound Surface Area (cm^2) 2 25 cm^2 12/09/20 1100   Wound Volume (cm^3) 0 cm^3 12/09/20 1100   Calculated Wound Volume (cm^3) 0 cm^3 12/09/20 1100   Drainage Amount None 12/09/20 1100   Dressing Non adherent; Foam, Silicon (eg   Allevyn, etc) 12/09/20 1100   Patient Tolerance Tolerated well 12/09/20 1100             Reviewed plan of care with primary RN  Recommendations written as orders  Questions or concerns Franny BEGUMN, RN, [facility]

## 2020-12-09 NOTE — ASSESSMENT & PLAN NOTE
· Baseline hemoglobin is 9 to 10  · Arrival hemoglobin was 5 8  · Status post 2 units of packed red blood cells with a resultant hemoglobin of 7 9  · No further testing, treatment and/or further transfusions

## 2020-12-09 NOTE — NURSING NOTE
Patient remains alert, oriented to person and place  Patient now comfort care, after Dr Sheryl Luna spoke in length to patient's daughter, via phone conversation

## 2020-12-09 NOTE — NURSING NOTE
Pt is lethargic and confused to place and time  Not receptive to reorientation  Poor short term memory  Pleasant and cooperative  Follows commands  Hard of hearing with visual deficit  No focal neuro deficit but has weakness to both lower extremities  Denies pain  Moves in bed poorly  Membranes are dry but pink  Oral intake held for mental status  Heart tones are clear with doppler pulses to both lower extremities  Skin to lower legs is discolored and brow with shriveled and dry scaly appearance  Multiple open areas noted which are also dry, some with dressing material stuck to the wounds  Generous amount of emollient applied to affected areas with silicone barrier  Skin is rashida and cool to touch  Temp is subthalamic  Warm blankets applied  Old dry drainage present  No edema  Lungs are decreased with wheezes to left fields  No shortness of breath noted  Abdomen is flat with good bowel sounds  No BM seen  Urinary catheter is draining bloody, cloudy urine  IV sites intact  Left heel elevated on pillows to off load  Oral care provided  Teeth are in various states of decay

## 2020-12-09 NOTE — DISCHARGE INSTR - OTHER ORDERS
Skin and Wound Care Plan:   1  Skin nourishing cream daily  2  Elevate heels off of bed at all times with pillows to offload  3  Apply Hydraguard to buttocks and sacrum BID and PRN  4  Turn and reposition Q2 hours or when medically stable  5  Allevyn life heel foams to bilateral heels, alesha left with T, alesha right with P, date, and peel back daily for skin assessment, change every 3 days or with soilage or dislodgement  6   Cleanse bilateral lower extremities with soap and water, pat dry, apply Xeroform to areas of open wound beds, cover with ABD pads and wrap with mckenzie, change daily or with soilage or dislodgement

## 2020-12-09 NOTE — ASSESSMENT & PLAN NOTE
Lab Results   Component Value Date    EGFR 27 12/08/2020    EGFR 28 10/08/2020    EGFR 29 09/16/2020    CREATININE 2 10 (H) 12/08/2020    CREATININE 2 01 (H) 10/08/2020    CREATININE 2 00 (H) 09/16/2020   · Patient baseline 1 4-1 6  · Avoid nephrotoxic medications  · Patient has received 2 L normal saline solution  · Continue with IV fluids  · Monitor labs  · If no improvement may need to consult to Nephrology  · Hemoglobin baseline is 9-10  · Patient is receiving 2 units of packed red blood cells  · Monitor H&H Q 6 hours  · Iron panel

## 2020-12-09 NOTE — ASSESSMENT & PLAN NOTE
· As evidence by tachypnea, hypoxia, leukocytosis, super therapeutic INR, lactic acidosis, anemia, elevated creatinine, UTI, respiratory failure  · Patient did receive 2 L normal saline, and IV cefepime  · Continue to monitor labs  · Repeat lactic acid of 1 1  · Procalcitonin is pending  · Blood cultures are pending  · Urine culture pending

## 2020-12-09 NOTE — ASSESSMENT & PLAN NOTE
· I had a long discussion with the patient's daughter Brenda Gr at phone number [phone] at 9:41 a m  In regards to goals of care  We reviewed the fact that the patient is 80years old, has sepsis, has a suspected urinary tract infection, is anemic, has had a Coumadin coagulopathy, and a possible GI bleed  We reviewed the possibility of the patient getting an endoscopy and/or colonoscopy  The patient's daughter went on to state that the patient would really not want any further aggressive testing, and or treatment  He has a very poor quality of life at baseline  She went on to state that he is ready to go"  At this time a decision was made, that we will implement full-blown comfort measures  There will be no further aggressive testing, treatment, and/or escalation in care  We will obtain a hospice evaluation  All medications will be discontinued  Ativan and morphine will be made available to the patient on an as-needed basis  This information was reiterated to Dr Ervin Alston - Nephrology, who is in agreement with this plan  · Will start full-blown comfort measures now

## 2020-12-09 NOTE — ASSESSMENT & PLAN NOTE
· INR to be between 2 and 3  · Patient had recently been on 2 separate doses of oral antibiotics for UTI  · Most recent antibiotic was amoxicillin which was stopped on 12/06/2020  · Patient's INR on admission is 13 24  · Patient did receive vitamin K 10 mg IV  · Monitor labs

## 2020-12-09 NOTE — PROGRESS NOTES
Progress Note - Arnold Snider 3/22/1930, 80 y o  male MRN: [id number]    Unit/Bed#: ICU 05 Encounter: [id number]    Primary Care Provider: Og Clark DO   Date and time admitted to hospital: 12/8/2020  3:17 PM        * Goals of care, counseling/discussion  Assessment & Plan  · I had a long discussion with the patient's daughter Long Linares at phone number [phone] at 9:41 a m  In regards to goals of care  We reviewed the fact that the patient is 80years old, has sepsis, has a suspected urinary tract infection, is anemic, has had a Coumadin coagulopathy, and a possible GI bleed  We reviewed the possibility of the patient getting an endoscopy and/or colonoscopy  The patient's daughter went on to state that the patient would really not want any further aggressive testing, and or treatment  He has a very poor quality of life at baseline  She went on to state that he is ready to go"  At this time a decision was made, that we will implement full-blown comfort measures  There will be no further aggressive testing, treatment, and/or escalation in care  We will obtain a hospice evaluation  All medications will be discontinued  Ativan and morphine will be made available to the patient on an as-needed basis  This information was reiterated to Dr Loyd Morrison - Nephrology, who is in agreement with this plan  · Will start full-blown comfort measures now      Sepsis with acute hypoxic respiratory failure without septic shock (HCC)  Assessment & Plan  · Comfort measures only status  · No further testing, treatment, and/or escalation in care as per family decision    Acute cystitis with hematuria  Assessment & Plan  · No further testing, treatment, and/or escalation in care    Acute respiratory failure with hypoxia ([facility] [address] )  Assessment & Plan  · Resolved  · Unspecified exact etiology - possibly secondary to the patient's arrival anemic state  · Current requirements are 1 L of supplemental oxygen via nasal cannula with a resultant O2 sat in the upper 90s  · Will attempt to wean the patient off of supplemental oxygen altogether  · Patient had required as much as 5 L of supplemental oxygen  · Continue respiratory protocol    Acute renal failure with acute tubular necrosis superimposed on stage 3b chronic kidney disease [facility])  Assessment & Plan  Lab Results   Component Value Date    EGFR 32 12/09/2020    EGFR 27 12/08/2020    EGFR 28 10/08/2020    CREATININE 1 80 (H) 12/09/2020    CREATININE 2 10 (H) 12/08/2020    CREATININE 2 01 (H) 10/08/2020   · Patient baseline 1 4-1 6  · Creatinine as high as 2 10 on arrival, today it is slightly better at 1 80  · With hypernatremia  · Status post a nephrology consult  · No further testing, treatment, and/or escalation in care    Paroxysmal atrial fibrillation (HCC)  Assessment & Plan  · No further medications, testing, and or treatment    Guaiac positive stools  Assessment & Plan  · Cancel GI consult    Benign prostatic hyperplasia with urinary retention  Assessment & Plan  · No further meds    Essential hypertension  Assessment & Plan  · No further meds    Acute blood loss anemia  Assessment & Plan  · Baseline hemoglobin is 9 to 10  · Arrival hemoglobin was 5 8  · Status post 2 units of packed red blood cells with a resultant hemoglobin of 7 9  · No further testing, treatment and/or further transfusions    Tobacco use  Assessment & Plan  · Nicotine patch    Multiple wounds of skin  Assessment & Plan  · Patient with significant lower extremity wounds  · Status post a wound care eval    Generalized weakness  Assessment & Plan  · To consult hospice    Supratherapeutic INR  Assessment & Plan  · No further testing, treatment, and/or escalation in care      VTE Prophylaxis:  No pharmacological VTE prophylaxis is indicated in this patient at the end of his life on full-blown comfort measures    Patient Centered Rounds: I have performed bedside rounds with nursing staff today     Discussions with Specialists or Other Care Team Provider:  Nephrology, case management, nursing and pharmacy  Education and Discussions with Family / Patient:  The patient's daughter Agnes Davenport was brought up to par, see the outline above    Current Length of Stay: 1 day(s)    Current Patient Status: Inpatient   Certification Statement: The patient will continue to require additional inpatient hospital stay due to Management of comfort measures    Discharge Plan:  No discharge planning as of yet    Code Status: Level 3 - DNAR and DNI    Subjective:   Patient seen and examined, patient very lethargic, hard of hearing, attempts to follow simple 1 step commands, and reports feeling very short of breath    Objective:     Vitals:   Temp (24hrs), Av 4 °F (35 8 °C), Min:95 9 °F (35 5 °C), Max:98 6 °F (37 °C)    Temp:  [95 9 °F (35 5 °C)-98 6 °F (37 °C)] 96 6 °F (35 9 °C)  HR:  [64-88] 71  Resp:  [17-33] 22  BP: ()/(51-86) 111/54  SpO2:  [88 %-100 %] 96 %  Body mass index is 22 96 kg/m²  Input and Output Summary (last 24 hours): Intake/Output Summary (Last 24 hours) at 2020 0959  Last data filed at 2020 0900  Gross per 24 hour   Intake 5233 75 ml   Output 1600 ml   Net 3633 75 ml       Physical Exam:   Physical Exam  Vitals signs and nursing note reviewed  Constitutional:       General: He is in acute distress  Appearance: Normal appearance  He is ill-appearing  Comments: Thin, frail and elderly   HENT:      Head: Normocephalic and atraumatic  Comments: Hard of hearing     Nose: Nose normal    Eyes:      Extraocular Movements: Extraocular movements intact  Pupils: Pupils are equal, round, and reactive to light  Neck:      Musculoskeletal: Normal range of motion and neck supple  No neck rigidity or muscular tenderness  Cardiovascular:      Rate and Rhythm: Normal rate and regular rhythm  Pulses: Normal pulses  Heart sounds: Normal heart sounds  No murmur  No friction rub  No gallop  Pulmonary:      Effort: Pulmonary effort is normal       Breath sounds: Normal breath sounds  Abdominal:      General: There is no distension  Palpations: Abdomen is soft  There is no mass  Tenderness: There is no abdominal tenderness  There is no guarding or rebound  Musculoskeletal: Normal range of motion  General: No swelling or tenderness  Right lower leg: No edema  Left lower leg: No edema  Skin:     General: Skin is warm  Capillary Refill: Capillary refill takes less than 2 seconds  Findings: No erythema or rash  Neurological:      General: No focal deficit present  Mental Status: He is alert  Mental status is at baseline  He is disoriented  Psychiatric:         Mood and Affect: Mood normal          Behavior: Behavior normal          Additional Data:     Labs:    Results from last 7 days   Lab Units 12/09/20  0538  12/08/20  1535   WBC Thousand/uL 18 10*  --  18 80*   HEMOGLOBIN g/dL 7 9*   < > 5 8*   HEMATOCRIT % 24 4*  --  18 5*   PLATELETS Thousands/uL 271  --  336   LYMPHO PCT %  --   --  4*   MONO PCT %  --   --  2*    < > = values in this interval not displayed  Results from last 7 days   Lab Units 12/09/20  0538   SODIUM mmol/L 147*   POTASSIUM mmol/L 4 3   CHLORIDE mmol/L 114*   CO2 mmol/L 24   BUN mg/dL 76*   CREATININE mg/dL 1 80*   CALCIUM mg/dL 7 6*   ALK PHOS U/L 63   ALT U/L <3*   AST U/L 13     Results from last 7 days   Lab Units 12/09/20  0538   INR  2 15*               * I Have Reviewed All Lab Data Listed Above  * Additional Pertinent Lab Tests Reviewed: [address] Admission  Reviewed    Imaging:  Imaging Reports Reviewed Today Include:  None    Recent Cultures (last 7 days):     Results from last 7 days   Lab Units 12/08/20  1535 12/08/20  1525   BLOOD CULTURE  Received in Microbiology Lab  Culture in Progress  Received in Microbiology Lab  Culture in Progress         Last 24 Hours Medication List:   Current Facility-Administered Medications   Medication Dose Route Frequency Provider Last Rate    acetaminophen  650 mg Oral Q6H PRN KEVIN Pabon      carbidopa-levodopa  1 tablet Oral Q12H KEVIN Bone      cefepime  1,000 mg Intravenous Q12H Mary Gomez, KEVIN 1,000 mg (12/09/20 0541)    chlorhexidine  15 mL Swish & Spit Q12H [address] KEVIN Bone      nicotine  1 patch Transdermal Daily KEVIN Bone      ondansetron  4 mg Intravenous Q6H PRN KEVIN Bone      oxybutynin  15 mg Oral HS KEVIN Bone      pantoprazole  40 mg Intravenous Q12H [address] KEVIN Bone      sodium chloride  75 mL/hr Intravenous Continuous KEVIN Bone 75 mL/hr (12/09/20 0018)    tamsulosin  0 4 mg Oral Daily With Backdoor, KEVIN      ursodiol  300 mg Oral TID KEVIN Bone      verapamil  180 mg Oral Daily KEVIN Bone          Today, Patient Was Seen By: Margaret Pierson MD    ** Please Note: Dictation voice to text software may have been used in the creation of this document   **

## 2020-12-09 NOTE — ASSESSMENT & PLAN NOTE
Lab Results   Component Value Date    EGFR 32 12/09/2020    EGFR 27 12/08/2020    EGFR 28 10/08/2020    CREATININE 1 80 (H) 12/09/2020    CREATININE 2 10 (H) 12/08/2020    CREATININE 2 01 (H) 10/08/2020   · Patient baseline 1 4-1 6  · Creatinine as high as 2 10 on arrival, today it is slightly better at 1 80  · With hypernatremia  · Status post a nephrology consult  · No further testing, treatment, and/or escalation in care

## 2020-12-10 VITALS
SYSTOLIC BLOOD PRESSURE: 98 MMHG | HEART RATE: 58 BPM | HEIGHT: 68 IN | DIASTOLIC BLOOD PRESSURE: 56 MMHG | RESPIRATION RATE: 17 BRPM | BODY MASS INDEX: 22.89 KG/M2 | WEIGHT: 151.01 LBS | OXYGEN SATURATION: 86 % | TEMPERATURE: 94 F

## 2020-12-10 PROBLEM — E44.0 MODERATE PROTEIN-CALORIE MALNUTRITION (HCC): Status: ACTIVE | Noted: 2020-12-10

## 2020-12-10 LAB — BACTERIA UR CULT: ABNORMAL

## 2020-12-10 PROCEDURE — 99239 HOSP IP/OBS DSCHRG MGMT >30: CPT | Performed by: HOSPITALIST

## 2020-12-10 RX ORDER — LORAZEPAM 2 MG/ML
0.5 CONCENTRATE ORAL EVERY 4 HOURS PRN
Status: DISCONTINUED | OUTPATIENT
Start: 2020-12-10 | End: 2020-12-10 | Stop reason: HOSPADM

## 2020-12-10 RX ORDER — LORAZEPAM 2 MG/ML
0.5 CONCENTRATE ORAL EVERY 4 HOURS PRN
Qty: 30 ML | Refills: 0 | Status: SHIPPED | OUTPATIENT
Start: 2020-12-10 | End: 2020-12-20

## 2020-12-10 RX ORDER — MORPHINE SULFATE 100 MG/5ML
5 SOLUTION, CONCENTRATE ORAL
Qty: 30 ML | Refills: 0 | Status: SHIPPED | OUTPATIENT
Start: 2020-12-10 | End: 2020-12-20

## 2020-12-10 RX ORDER — MORPHINE SULFATE 100 MG/5ML
5 SOLUTION, CONCENTRATE ORAL
Status: DISCONTINUED | OUTPATIENT
Start: 2020-12-10 | End: 2020-12-10 | Stop reason: HOSPADM

## 2020-12-10 RX ADMIN — MORPHINE SULFATE 2 MG: 2 INJECTION, SOLUTION INTRAMUSCULAR; INTRAVENOUS at 03:44

## 2020-12-10 NOTE — ASSESSMENT & PLAN NOTE
· Comfort measures only status  · No further testing, treatment, and/or escalation in care as per family decision  · DC home with home hospice

## 2020-12-10 NOTE — CASE MANAGEMENT
Received message from Aleja from [facility] the equipment will be delivered this am for the pt  Pt has been evaluated by [facility] and is stable to be discharged to home with [facility]  TC to [employer] and made arrangements for BLS transport for 11:30  MD to send scripts to [employer]  TC to pt daughter Arcelia Pierce to notify of discharge time and to obtain medications  RN [place] aware of transport time

## 2020-12-10 NOTE — NURSING NOTE
Off unit via stretcher  IV sites removed; no redness or swelling; 2X2 to cover/papertape to secure   All DC instructions and patient valuables with [address] [facility] Transport team

## 2020-12-10 NOTE — ASSESSMENT & PLAN NOTE
Lab Results   Component Value Date    EGFR 32 12/09/2020    EGFR 27 12/08/2020    EGFR 28 10/08/2020    CREATININE 1 80 (H) 12/09/2020    CREATININE 2 10 (H) 12/08/2020    CREATININE 2 01 (H) 10/08/2020   · Appreciate Nephrology input  · NC home with home hospice

## 2020-12-10 NOTE — NURSING NOTE
Patient assessment as charted  Will discharge at 1130 today for home to begin hospice care  Transport via [employer]

## 2020-12-10 NOTE — ASSESSMENT & PLAN NOTE
· Resolved  · Unspecified exact etiology - possibly secondary to the patient's arrival anemic state  · DC home with home hospice

## 2020-12-10 NOTE — DISCHARGE SUMMARY
Discharge- Nereyda Wen 3/22/1930, 80 y o  male MRN: [id number]    Unit/Bed#: ICU 05 Encounter: [id number]    Primary Care Provider: Hilda Putnam DO   Date and time admitted to hospital: 12/8/2020  3:17 PM        * Goals of care, counseling/discussion  Assessment & Plan  · I had a long discussion with the patient's daughter Kait Eaton at phone number [phone] at 9:41 a m  In regards to goals of care  We reviewed the fact that the patient is 80years old, has sepsis, has a suspected urinary tract infection, is anemic, has had a Coumadin coagulopathy, and a possible GI bleed  We reviewed the possibility of the patient getting an endoscopy and/or colonoscopy  The patient's daughter went on to state that the patient would really not want any further aggressive testing, and or treatment  He has a very poor quality of life at baseline  She went on to state that he is ready to go"  At this time a decision was made, that we will implement full-blown comfort measures  There will be no further aggressive testing, treatment, and/or escalation in care  We will obtain a hospice evaluation  All medications will be discontinued  Ativan and morphine will be made available to the patient on an as-needed basis  This information was reiterated to Dr Fredericka Seip - Nephrology, who is in agreement with this plan  · Will start full-blown comfort measures now     -----------------------------  · Update on 12/10/2020 - patient was seen by [facility], was deemed a good candidate to be sent home with home hospice  Patient will go home today at 11:30 a m  Under the umbrella of hospice  I reviewed the patient's care with his daughter Jeanine Hale, a [place] out of hospital life-sustaining Treatment form was completed  Patient is a full DNR, DNI, and is being discharged on comfort measures with the instructions of no further rehospitalizations    · The patient's primary care provider Dr Hilda Putnam was also notified directly by me, and he too is in agreement with this  · Prescriptions have been provided for both lorazepam and Roxanol      Sepsis with acute hypoxic respiratory failure without septic shock (HCC)  Assessment & Plan  · Comfort measures only status  · No further testing, treatment, and/or escalation in care as per family decision  · DC home with home hospice    Acute cystitis with hematuria  Assessment & Plan  · No further testing, treatment, and/or escalation in care  · DC home with home hospice    Acute respiratory failure with hypoxia ([facility] [address] )  Assessment & Plan  · Resolved  · Unspecified exact etiology - possibly secondary to the patient's arrival anemic state  · DC home with home hospice    Acute renal failure with acute tubular necrosis superimposed on stage 3b chronic kidney disease [facility])  Assessment & Plan  Lab Results   Component Value Date    EGFR 32 12/09/2020    EGFR 27 12/08/2020    EGFR 28 10/08/2020    CREATININE 1 80 (H) 12/09/2020    CREATININE 2 10 (H) 12/08/2020    CREATININE 2 01 (H) 10/08/2020   · Appreciate Nephrology input  · DC home with home hospice    Paroxysmal atrial fibrillation ([facility] [address] )  Assessment & Plan  · No further medications, testing, and or treatment    Guaiac positive stools  Assessment & Plan  · Cancel GI consult  · DC home with home hospice    Benign prostatic hyperplasia with urinary retention  Assessment & Plan  · No further meds    Essential hypertension  Assessment & Plan  · No further meds    Acute blood loss anemia  Assessment & Plan  · Baseline hemoglobin is 9 to 10  · Arrival hemoglobin was 5 8  · Status post 2 units of packed red blood cells with a resultant hemoglobin of 7 9  · No further testing, treatment and/or further transfusions    Tobacco use  Assessment & Plan  · Nicotine patch    Multiple wounds of skin  Assessment & Plan  · Patient with significant lower extremity wounds  · Status post a wound care eval    Generalized weakness  Assessment & Plan  · Home with home hospice    Supratherapeutic INR  Assessment & Plan  · No further testing, treatment, and/or escalation in care    Moderate protein-calorie malnutrition ([address] )  Assessment & Plan  Malnutrition Findings:   Adult Malnutrition type: Chronic illness  Adult Degree of Malnutrition: Malnutrition of moderate degree(in setting of renal d/o as evidenced by inadequate energy intake moderate muscle wasting clavicle, scapula, deltoid, temporalis area, moderate fat loss triceps, buccal pad treated with clear liquids/potential dt advancement)    BMI Findings:  Adult BMI Classifications: (22 96)     Body mass index is 22 96 kg/m²  No further testing, treatment, and/or escalation in care            Discharging Physician / Practitioner: Gabo Grceo MD  PCP: Paty Christina DO  Admission Date:   Admission Orders (From admission, onward)     Ordered        12/08/20 1745  Inpatient Admission  Once         12/08/20 1725  Inpatient Admission  Once                   Discharge Date: 12/10/20    Resolved Problems  Date Reviewed: 12/8/2020    None          Consultations During Hospital Stay:  · Nephrology    Procedures Performed:   · None    Significant Findings / Test Results:   · Chest x-ray-no acute cardiopulmonary disease    Incidental Findings:   · None     Test Results Pending at Discharge (will require follow up): · None     Outpatient Tests Requested:  · None    Complications:     None    Reason for Admission:  Acute anemia, acute Coumadin coagulopathy    Hospital Course:     Zachariah Luciano is a 80 y o  male patient who originally presented to the hospital on 12/8/2020 due to generalized weakness  Please refer to the initial history and physical examination completed by Poppy Rodriguez for the initial presenting features and complaints    In brief, the patient is a 80-year-old male, with a significant history of comorbid medical conditions, and is a patient that was admitted to the hospital after he came into the emergency room with a chief complaint of generalized weakness  In the emergency room he was found to have a hemoglobin of 5 8, an INR of 13 24, and a white count of 18 8  He was transfused 2 units of packed red blood cells and was concomitantly admitted to the ICU  From a clinical standpoint the patient was not doing well  After receiving 2 units of packed red blood cells his hemoglobin came up to 7 9  He was found to be in a state of an acute kidney injury and was also found to be hypernatremic  Stool testing for blood was positive  A GI consultation was requested  Prior to GI evaluating the patient, a long discussion was held with the patient's family members in regards to goals of care  At that point a decision was made to implement comfort measures  The family was not interested in further aggressive and or invasive treatment  A hospice evaluation was requested  Patient was accepted to home hospice  Patient was discharged home with home hospice on 12/10/2020  Prescriptions were provided for Roxanol and Ativan  [facility] out of hospital life-sustaining Treatment form was completed  Patient's primary care provider was notified  All medications were discontinued  Prognosis is bleak  Please see above list of diagnoses and related plan for additional information  Condition at Discharge: critical  - patient is at the end of his life    Discharge Day Visit / Exam:     Subjective:  Patient seen and examined - lethargic but easily arousable, follow simple 1 step commands  Vitals: Blood Pressure: 98/56 (12/10/20 0800)  Pulse: 58 (12/10/20 0800)  Temperature: (!) 94 °F (34 4 °C) (12/10/20 0800)  Temp Source: Tympanic (12/10/20 0800)  Respirations: 17 (12/10/20 0800)  Height: 5' 8" (172 7 cm) (12/08/20 1850)  Weight - Scale: 68 5 kg (151 lb 0 2 oz) (12/09/20 0500)  SpO2: (!) 86 % (12/10/20 0800)  Exam:   Physical Exam  Vitals signs and nursing note reviewed  Constitutional:       General: He is not in acute distress  Appearance: Normal appearance  He is not ill-appearing  HENT:      Head: Normocephalic and atraumatic  Nose: Nose normal    Eyes:      Extraocular Movements: Extraocular movements intact  Pupils: Pupils are equal, round, and reactive to light  Neck:      Musculoskeletal: Normal range of motion and neck supple  No neck rigidity or muscular tenderness  Cardiovascular:      Rate and Rhythm: Normal rate and regular rhythm  Pulses: Normal pulses  Heart sounds: Normal heart sounds  No murmur  No friction rub  No gallop  Pulmonary:      Effort: Pulmonary effort is normal       Breath sounds: Normal breath sounds  Abdominal:      General: There is no distension  Palpations: Abdomen is soft  There is no mass  Tenderness: There is no abdominal tenderness  There is no guarding or rebound  Musculoskeletal: Normal range of motion  General: No swelling or tenderness  Right lower leg: No edema  Left lower leg: No edema  Skin:     General: Skin is warm  Capillary Refill: Capillary refill takes less than 2 seconds  Findings: No erythema or rash  Comments: Multiple wounds noted all over his body specifically more so on the bilateral lower extremities   Neurological:      General: No focal deficit present  Mental Status: He is alert and oriented to person, place, and time  Mental status is at baseline  Comments: Lethargic but arousable   Psychiatric:         Mood and Affect: Mood normal          Behavior: Behavior normal          Discussion with Family:  Patient's daughter Robi Rojo was brought up to par with the plan of care in regards to discharge for today, all questions answered to her satisfaction    Discharge instructions/Information to patient and family:   See after visit summary for information provided to patient and family        Provisions for Follow-Up Care:  See after visit summary for information related to follow-up care and any pertinent home health orders  Disposition:     Other: Home with home hospice      Planned Readmission:    None     Discharge Statement:  I spent 55 minutes discharging the patient  This time was spent on the day of discharge  I had direct contact with the patient on the day of discharge  Greater than 50% of the total time was spent examining patient, answering all patient questions, arranging and discussing plan of care with patient as well as directly providing post-discharge instructions  Additional time then spent on discharge activities  Discharge Medications:  See after visit summary for reconciled discharge medications provided to patient and family        ** Please Note: This note has been constructed using a voice recognition system **

## 2020-12-10 NOTE — DISCHARGE INSTR - AVS FIRST PAGE
Dear Moriah Hair,     It was our pleasure to care for you here at [facility], [facility]  It is our hope that we were always able to exceed the expected standards for your care during your stay  You were hospitalized due to anemia and a Coumadin coagulopathy  You were cared for on the ICU floor by Harjit Coppola MD with the [facility] who covers for your primary care physician (PCP), José Miguel Carreon DO, while you were hospitalized  If you have any questions or concerns related to this hospitalization, you may contact us at [phone]  For follow up as well as any medication refills, we recommend that you follow up with your primary care physician  A registered nurse will reach out to you by phone within a few days after your discharge to answer any additional questions that you may have after going home  However, at this time we provide for you here, the most important instructions / recommendations at discharge:     · Notable Medication Adjustments -   · Stop all medications  · New prescriptions provided for Roxanol and lorazepam  · Testing Required after Discharge -   · None  · Important follow up information -   · Please follow-up with the [address] [place] [address] hospice team  · Other Instructions -   · Okay for pleasure feeds  · Please review this entire after visit summary as additional general instructions including medication list, appointments, activity, diet, any pertinent wound care, and other additional recommendations from your care team that may be provided for you        Sincerely,     Harjit Coppola MD

## 2020-12-10 NOTE — ASSESSMENT & PLAN NOTE
· I had a long discussion with the patient's daughter Rodney Carrion at phone number [phone] at 9:41 a m  In regards to goals of care  We reviewed the fact that the patient is 80years old, has sepsis, has a suspected urinary tract infection, is anemic, has had a Coumadin coagulopathy, and a possible GI bleed  We reviewed the possibility of the patient getting an endoscopy and/or colonoscopy  The patient's daughter went on to state that the patient would really not want any further aggressive testing, and or treatment  He has a very poor quality of life at baseline  She went on to state that he is ready to go"  At this time a decision was made, that we will implement full-blown comfort measures  There will be no further aggressive testing, treatment, and/or escalation in care  We will obtain a hospice evaluation  All medications will be discontinued  Ativan and morphine will be made available to the patient on an as-needed basis  This information was reiterated to Dr Samuel Cisneros - Nephrology, who is in agreement with this plan  · Will start full-blown comfort measures now     -----------------------------  · Update on 12/10/2020 - patient was seen by [facility], was deemed a good candidate to be sent home with home hospice  Patient will go home today at 11:30 a m  Under the umbrella of hospice  I reviewed the patient's care with his daughter Geoff Farrar, a [place] out of hospital life-sustaining Treatment form was completed  Patient is a full DNR, DNI, and is being discharged on comfort measures with the instructions of no further rehospitalizations  · The patient's primary care provider Dr Che De Leon was also notified directly by me, and he too is in agreement with this  · Prescriptions have been provided for both lorazepam and Roxanol

## 2020-12-10 NOTE — ASSESSMENT & PLAN NOTE
Malnutrition Findings:   Adult Malnutrition type: Chronic illness  Adult Degree of Malnutrition: Malnutrition of moderate degree(in setting of renal d/o as evidenced by inadequate energy intake moderate muscle wasting clavicle, scapula, deltoid, temporalis area, moderate fat loss triceps, buccal pad treated with clear liquids/potential dt advancement)    BMI Findings:  Adult BMI Classifications: (22 96)     Body mass index is 22 96 kg/m²     No further testing, treatment, and/or escalation in care

## 2020-12-10 NOTE — DISCHARGE INSTRUCTIONS
A-fib (Atrial Fibrillation)   WHAT YOU NEED TO KNOW:   A-fib may come and go, or it may be a long-term condition  A-fib can cause blood clots, stroke, or heart failure  These conditions may become life-threatening  It is important to treat and manage a-fib to help prevent a blood clot, stroke, or heart failure  DISCHARGE INSTRUCTIONS:   Call your local emergency number (911 in the [address] [facility]) if:   · You have any of the following signs of a heart attack:      ? Squeezing, pressure, or pain in your chest    ? You may  also have any of the following:     ? Discomfort or pain in your back, neck, jaw, stomach, or arm    ? Shortness of breath    ? Nausea or vomiting    ? Lightheadedness or a sudden cold sweat    · You have any of the following signs of a stroke:      ? Numbness or drooping on one side of your face     ? Weakness in an arm or leg    ? Confusion or difficulty speaking    ? Dizziness, a severe headache, or vision loss    Call your cardiologist if:   · Your arm or leg feels warm, tender, and painful  It may look swollen and red  · Your heart rate is more than 110 beats per minute  · You have new or worsening swelling in your legs, feet, ankles, or abdomen  · You are short of breath, even at rest      · You have questions or concerns about your condition or care  Medicines: You may need any of the following:  · Heart medicines  help control your heart rate or rhythm  You may need more than one medicine to treat your symptoms  · Blood thinners  help prevent blood clots  Clots can cause strokes, heart attacks, and death  The following are general safety guidelines to follow while you are taking a blood thinner:    ? Watch for bleeding and bruising while you take blood thinners  Watch for bleeding from your gums or nose  Watch for blood in your urine and bowel movements  Use a soft washcloth on your skin, and a soft toothbrush to brush your teeth  This can keep your skin and gums from bleeding   If you shave, use an electric shaver  Do not play contact sports  ? Tell your dentist and other healthcare providers that you take a blood thinner  Wear a bracelet or necklace that says you take this medicine  ? Do not start or stop any other medicines unless your healthcare provider tells you to  Many medicines cannot be used with blood thinners  ? Take your blood thinner exactly as prescribed by your healthcare provider  Do not skip does or take less than prescribed  Tell your provider right away if you forget to take your blood thinner, or if you take too much  ? Warfarin  is a blood thinner that you may need to take  The following are things you should be aware of if you take warfarin:     § Foods and medicines can affect the amount of warfarin in your blood  Do not make major changes to your diet while you take warfarin  Warfarin works best when you eat about the same amount of vitamin K every day  Vitamin K is found in green leafy vegetables and certain other foods  Ask for more information about what to eat when you are taking warfarin  § You will need to see your healthcare provider for follow-up visits when you are on warfarin  You will need regular blood tests  These tests are used to decide how much medicine you need  · Antiplatelets , such as aspirin, help prevent blood clots  Take your antiplatelet medicine exactly as directed  These medicines make it more likely for you to bleed or bruise  If you are told to take aspirin, do not take acetaminophen or ibuprofen instead  · Take your medicine as directed  Contact your healthcare provider if you think your medicine is not helping or if you have side effects  Tell him or her if you are allergic to any medicine  Keep a list of the medicines, vitamins, and herbs you take  Include the amounts, and when and why you take them  Bring the list or the pill bottles to follow-up visits   Carry your medicine list with you in case of an emergency  Manage A-fib:   · Know your target heart rate  Learn how to check your pulse and monitor your heart rate  · Know the risks if you choose to drink alcohol  Alcohol can make a-fib hard to manage  Ask your healthcare provider if it is safe for you to drink alcohol  A drink of alcohol is 12 ounces of beer, 5 ounces of wine, or 1½ ounces of liquor  · Do not smoke  Nicotine can cause heart damage and make it more difficult to manage your a-fib  Do not use e-cigarettes or smokeless tobacco in place of cigarettes or to help you quit  They still contain nicotine  Ask your healthcare provider for information if you currently smoke and need help quitting  · Eat heart-healthy foods  Heart healthy foods will help keep your cholesterol low  These include fruits, vegetables, whole-grain breads, low-fat dairy products, beans, lean meats, and fish  Replace butter and margarine with heart-healthy oils such as olive oil and canola oil  · Maintain a healthy weight  Ask your healthcare provider how much you should weigh  Ask him or her to help you create a safe weight loss plan if you are overweight  Even a small goal of a 10% weight loss can improve your heart health  · Get regular physical activity  Physical activity helps improve your heart health  Get at least 150 minutes of moderate aerobic physical activity each week  Your healthcare provider can help you create an activity plan  · Manage other health conditions  This includes high blood pressure or cholesterol, sleep apnea, diabetes, and other heart conditions  Take medicine as directed and follow your treatment plan  Follow up with your cardiologist as directed: You will need regular blood tests and monitoring  Write down your questions so you remember to ask them during your visits     © Copyright [address] Information is for End User's use only and may not be sold, redistributed or otherwise used for commercial purposes  All illustrations and images included in CareNotes® are the copyrighted property of A D A M , Inc  or Ragini Reyes   The above information is an  only  It is not intended as medical advice for individual conditions or treatments  Talk to your doctor, nurse or pharmacist before following any medical regimen to see if it is safe and effective for you  A-fib (Atrial Fibrillation)   WHAT YOU NEED TO KNOW:   A-fib may come and go, or it may be a long-term condition  A-fib can cause blood clots, stroke, or heart failure  These conditions may become life-threatening  It is important to treat and manage a-fib to help prevent a blood clot, stroke, or heart failure  DISCHARGE INSTRUCTIONS:   Call your local emergency number (911 in the [address] [place] [address],[address]) if:   · You have any of the following signs of a heart attack:      ? Squeezing, pressure, or pain in your chest    ? You may  also have any of the following:     ? Discomfort or pain in your back, neck, jaw, stomach, or arm    ? Shortness of breath    ? Nausea or vomiting    ? Lightheadedness or a sudden cold sweat    · You have any of the following signs of a stroke:      ? Numbness or drooping on one side of your face     ? Weakness in an arm or leg    ? Confusion or difficulty speaking    ? Dizziness, a severe headache, or vision loss    Call your cardiologist if:   · Your arm or leg feels warm, tender, and painful  It may look swollen and red  · Your heart rate is more than 110 beats per minute  · You have new or worsening swelling in your legs, feet, ankles, or abdomen  · You are short of breath, even at rest      · You have questions or concerns about your condition or care  Medicines: You may need any of the following:  · Heart medicines  help control your heart rate or rhythm  You may need more than one medicine to treat your symptoms  · Blood thinners  help prevent blood clots   Clots can cause strokes, heart attacks, and death  The following are general safety guidelines to follow while you are taking a blood thinner:    ? Watch for bleeding and bruising while you take blood thinners  Watch for bleeding from your gums or nose  Watch for blood in your urine and bowel movements  Use a soft washcloth on your skin, and a soft toothbrush to brush your teeth  This can keep your skin and gums from bleeding  If you shave, use an electric shaver  Do not play contact sports  ? Tell your dentist and other healthcare providers that you take a blood thinner  Wear a bracelet or necklace that says you take this medicine  ? Do not start or stop any other medicines unless your healthcare provider tells you to  Many medicines cannot be used with blood thinners  ? Take your blood thinner exactly as prescribed by your healthcare provider  Do not skip does or take less than prescribed  Tell your provider right away if you forget to take your blood thinner, or if you take too much  ? Warfarin  is a blood thinner that you may need to take  The following are things you should be aware of if you take warfarin:     § Foods and medicines can affect the amount of warfarin in your blood  Do not make major changes to your diet while you take warfarin  Warfarin works best when you eat about the same amount of vitamin K every day  Vitamin K is found in green leafy vegetables and certain other foods  Ask for more information about what to eat when you are taking warfarin  § You will need to see your healthcare provider for follow-up visits when you are on warfarin  You will need regular blood tests  These tests are used to decide how much medicine you need  · Antiplatelets , such as aspirin, help prevent blood clots  Take your antiplatelet medicine exactly as directed  These medicines make it more likely for you to bleed or bruise  If you are told to take aspirin, do not take acetaminophen or ibuprofen instead       · Take your medicine as directed  Contact your healthcare provider if you think your medicine is not helping or if you have side effects  Tell him or her if you are allergic to any medicine  Keep a list of the medicines, vitamins, and herbs you take  Include the amounts, and when and why you take them  Bring the list or the pill bottles to follow-up visits  Carry your medicine list with you in case of an emergency  Manage A-fib:   · Know your target heart rate  Learn how to check your pulse and monitor your heart rate  · Know the risks if you choose to drink alcohol  Alcohol can make a-fib hard to manage  Ask your healthcare provider if it is safe for you to drink alcohol  A drink of alcohol is 12 ounces of beer, 5 ounces of wine, or 1½ ounces of liquor  · Do not smoke  Nicotine can cause heart damage and make it more difficult to manage your a-fib  Do not use e-cigarettes or smokeless tobacco in place of cigarettes or to help you quit  They still contain nicotine  Ask your healthcare provider for information if you currently smoke and need help quitting  · Eat heart-healthy foods  Heart healthy foods will help keep your cholesterol low  These include fruits, vegetables, whole-grain breads, low-fat dairy products, beans, lean meats, and fish  Replace butter and margarine with heart-healthy oils such as olive oil and canola oil  · Maintain a healthy weight  Ask your healthcare provider how much you should weigh  Ask him or her to help you create a safe weight loss plan if you are overweight  Even a small goal of a 10% weight loss can improve your heart health  · Get regular physical activity  Physical activity helps improve your heart health  Get at least 150 minutes of moderate aerobic physical activity each week  Your healthcare provider can help you create an activity plan  · Manage other health conditions    This includes high blood pressure or cholesterol, sleep apnea, diabetes, and other heart conditions  Take medicine as directed and follow your treatment plan  Follow up with your cardiologist as directed: You will need regular blood tests and monitoring  Write down your questions so you remember to ask them during your visits  © Copyright [address] Information is for End User's use only and may not be sold, redistributed or otherwise used for commercial purposes  All illustrations and images included in CareNotes® are the copyrighted property of A D A M , Inc  or [facility] Ady Reyes   The above information is an  only  It is not intended as medical advice for individual conditions or treatments  Talk to your doctor, nurse or pharmacist before following any medical regimen to see if it is safe and effective for you  A-fib (Atrial Fibrillation)   WHAT YOU NEED TO KNOW:   A-fib may come and go, or it may be a long-term condition  A-fib can cause blood clots, stroke, or heart failure  These conditions may become life-threatening  It is important to treat and manage a-fib to help prevent a blood clot, stroke, or heart failure  WHILE YOU ARE HERE:   Informed consent  is a legal document that explains the tests, treatments, or procedures that you may need  Informed consent means you understand what will be done and can make decisions about what you want  You give your permission when you sign the consent form  You can have someone sign this form for you if you are not able to sign it  You have the right to understand your medical care in words you know  Before you sign the consent form, understand the risks and benefits of what will be done  Make sure all your questions are answered  Medicines: You may  receive any of the following:  · Beta-blockers  help slow your heartbeat  · Calcium channel blockers  help slow your heartbeat  · Antiarrhythmics  help make your heart rhythm regular  · Blood thinners  help prevent blood clots   Blood thinners may be given before, during, and after a surgery or procedure  Blood thinners make it more likely for you to bleed or bruise  Monitoring:   · Pulse oximetry  measures how much oxygen is in your blood  · Telemetry  is continuous monitoring of your heart rhythm  Sticky pads placed on your skin connect to an EKG machine that records your heart rate and rhythm  Tests:   · A chest x-ray  shows the structure of your heart and lungs  It may show if another condition is causing your symptoms  · Blood and urine tests  check for infection, potassium and calcium levels, and thyroid function  · An EKG  records your heart rhythm and how fast your heart beats  · An echocardiogram  is a type of ultrasound  Sound waves are used to show the structure and function of your heart  Treatment:   · Cardioversion  is a procedure to return your heart rate and rhythm to normal  It can be done using medicines or electric shock  · A-fib ablation  is a procedure that uses energy to burn a small area of heart tissue  This creates scar tissue and prevents electrical signals that cause a-fib  You may need this procedure more than once  Ask for more information on a-fib ablation  · A pacemaker  may be inserted into your heart  A pacemaker is a device that controls your heartbeat  A pacemaker may be inserted during an ablation procedure or surgery  Ask your healthcare provider for more information on pacemakers  · Surgery  may be needed if other procedures do not work  During surgery your healthcare provider will make cuts in the upper part of your heart  The provider will stitch the cuts together to create scar tissue  The scar tissue will prevent electrical signals that cause a-fib  RISKS:   Treatment may fail to control your heart rate and rhythm  A-fib can increase your risk of heart failure and other heart conditions  A-fib may cause heart attack, heart failure, blood clots, or a stroke   These conditions can be life-threatening  CARE AGREEMENT:   You have the right to help plan your care  Learn about your health condition and how it may be treated  Discuss treatment options with your healthcare providers to decide what care you want to receive  You always have the right to refuse treatment  © Copyright [address] Information is for End User's use only and may not be sold, redistributed or otherwise used for commercial purposes  All illustrations and images included in CareNotes® are the copyrighted property of A D A M , Inc  or [facility] Ady Reyes   The above information is an  only  It is not intended as medical advice for individual conditions or treatments  Talk to your doctor, nurse or pharmacist before following any medical regimen to see if it is safe and effective for you

## 2020-12-10 NOTE — NURSING NOTE
Patient medicated with morphine 2 mg IV as ordered as part of end of life care and keeping patient comfortable; will monitor

## 2020-12-11 NOTE — UTILIZATION REVIEW
Notification of Discharge  This is a Notification of Discharge from our facility [address]  Please be advised that this patient has been discharge from our facility  Below you will find the admission and discharge date and time including the patients disposition  PRESENTATION DATE: 12/8/2020  3:17 PM  OBS ADMISSION DATE:   IP ADMISSION DATE: 12/8/20 1725   DISCHARGE DATE: 12/10/2020 11:30 AM  DISPOSITION: Home/Self Care Home/Self Care   Admission Orders listed below:  Admission Orders (From admission, onward)     Ordered        12/08/20 1745  Inpatient Admission  Once         12/08/20 1725  Inpatient Admission  Once                   Please contact the UR Department if additional information is required to close this patient's authorization/case  [facility] Util[facility] Review Department  Main: [phone] x carefully listen to the prompts  All voicemails are confidential   [email or web address]  Send all requests for admission clinical reviews, approved or denied determinations and any other requests to dedicated fax number below belonging to the campus where the patient is receiving treatment   List of dedicated fax numbers:  [address] DENIALS (Administrative/Medical Necessity) [phone]   1000  [facility] (Maternity/NICU/Pediatrics) [phone]   Liliana Luong [phone]   Erasmo Haynes [phone]   Cristi Silevrman [phone]   Neeraj Matamorosis [address] [facility] [phone]   [facility]  [phone]   [address] [facility] [address]  [address] [facility] [address] [facility] [phone]

## 2020-12-14 LAB
BACTERIA BLD CULT: NORMAL
BACTERIA BLD CULT: NORMAL